# Patient Record
Sex: MALE | Race: WHITE | NOT HISPANIC OR LATINO | Employment: FULL TIME | URBAN - METROPOLITAN AREA
[De-identification: names, ages, dates, MRNs, and addresses within clinical notes are randomized per-mention and may not be internally consistent; named-entity substitution may affect disease eponyms.]

---

## 2017-02-16 ENCOUNTER — ALLSCRIPTS OFFICE VISIT (OUTPATIENT)
Dept: OTHER | Facility: OTHER | Age: 32
End: 2017-02-16

## 2017-02-16 DIAGNOSIS — R55 SYNCOPE AND COLLAPSE: ICD-10-CM

## 2017-02-16 DIAGNOSIS — R42 DIZZINESS AND GIDDINESS: ICD-10-CM

## 2017-02-24 ENCOUNTER — GENERIC CONVERSION - ENCOUNTER (OUTPATIENT)
Dept: OTHER | Facility: OTHER | Age: 32
End: 2017-02-24

## 2017-02-24 LAB
A/G RATIO (HISTORICAL): 2 (ref 1.1–2.5)
ALBUMIN SERPL BCP-MCNC: 4.7 G/DL (ref 3.5–5.5)
ALP SERPL-CCNC: 61 IU/L (ref 39–117)
ALT SERPL W P-5'-P-CCNC: 41 IU/L (ref 0–44)
AST SERPL W P-5'-P-CCNC: 21 IU/L (ref 0–40)
BASOPHILS # BLD AUTO: 0 %
BASOPHILS # BLD AUTO: 0 X10E3/UL (ref 0–0.2)
BILIRUB SERPL-MCNC: 0.3 MG/DL (ref 0–1.2)
BUN SERPL-MCNC: 16 MG/DL (ref 6–20)
BUN/CREA RATIO (HISTORICAL): 18 (ref 8–19)
CALCIUM SERPL-MCNC: 9.9 MG/DL (ref 8.7–10.2)
CHLORIDE SERPL-SCNC: 102 MMOL/L (ref 96–106)
CHOLEST SERPL-MCNC: 141 MG/DL (ref 100–199)
CHOLEST/HDLC SERPL: 3.5 RATIO UNITS (ref 0–5)
CO2 SERPL-SCNC: 23 MMOL/L (ref 18–29)
CREAT SERPL-MCNC: 0.87 MG/DL (ref 0.76–1.27)
DEPRECATED RDW RBC AUTO: 13.5 % (ref 12.3–15.4)
EGFR AFRICAN AMERICAN (HISTORICAL): 133 ML/MIN/1.73
EGFR-AMERICAN CALC (HISTORICAL): 115 ML/MIN/1.73
EOSINOPHIL # BLD AUTO: 0.2 X10E3/UL (ref 0–0.4)
EOSINOPHIL # BLD AUTO: 2 %
GLUCOSE SERPL-MCNC: 102 MG/DL (ref 65–99)
HCT VFR BLD AUTO: 43.3 % (ref 37.5–51)
HDLC SERPL-MCNC: 40 MG/DL
HGB BLD-MCNC: 15 G/DL (ref 12.6–17.7)
IMM.GRANULOCYTES (CD4/8) (HISTORICAL): 0 %
IMM.GRANULOCYTES (CD4/8) (HISTORICAL): 0 X10E3/UL (ref 0–0.1)
LDLC SERPL CALC-MCNC: 80 MG/DL (ref 0–99)
LYMPHOCYTES # BLD AUTO: 2.8 X10E3/UL (ref 0.7–3.1)
LYMPHOCYTES # BLD AUTO: 42 %
MCH RBC QN AUTO: 28.4 PG (ref 26.6–33)
MCHC RBC AUTO-ENTMCNC: 34.6 G/DL (ref 31.5–35.7)
MCV RBC AUTO: 82 FL (ref 79–97)
MONOCYTES # BLD AUTO: 0.6 X10E3/UL (ref 0.1–0.9)
MONOCYTES (HISTORICAL): 8 %
NEUTROPHILS # BLD AUTO: 3.2 X10E3/UL (ref 1.4–7)
NEUTROPHILS # BLD AUTO: 48 %
PLATELET # BLD AUTO: 199 X10E3/UL (ref 150–379)
POTASSIUM SERPL-SCNC: 4.3 MMOL/L (ref 3.5–5.2)
RBC (HISTORICAL): 5.29 X10E6/UL (ref 4.14–5.8)
SODIUM SERPL-SCNC: 144 MMOL/L (ref 134–144)
TOT. GLOBULIN, SERUM (HISTORICAL): 2.4 G/DL (ref 1.5–4.5)
TOTAL PROTEIN (HISTORICAL): 7.1 G/DL (ref 6–8.5)
TRIGL SERPL-MCNC: 106 MG/DL (ref 0–149)
VLDLC SERPL CALC-MCNC: 21 MG/DL (ref 5–40)
WBC # BLD AUTO: 6.7 X10E3/UL (ref 3.4–10.8)

## 2017-02-25 LAB
FERRITIN SERPL-MCNC: 322 NG/ML (ref 30–400)
LDL CHOLESTEROL DIRECT (HISTORICAL): 87 MG/DL (ref 0–99)
LYME 18 KD IGG (HISTORICAL): NORMAL
LYME 23 KD IGG (HISTORICAL): NORMAL
LYME 23 KD IGM (HISTORICAL): NORMAL
LYME 28 KD IGG (HISTORICAL): NORMAL
LYME 30 KD IGG (HISTORICAL): NORMAL
LYME 39 KD IGG (HISTORICAL): NORMAL
LYME 39 KD IGM (HISTORICAL): NORMAL
LYME 41 KD IGG (HISTORICAL): NORMAL
LYME 41 KD IGM (HISTORICAL): NORMAL
LYME 45 KD IGG (HISTORICAL): NORMAL
LYME 58 KD IGG (HISTORICAL): NORMAL
LYME 66 KD IGG (HISTORICAL): NORMAL
LYME 93 KD IGG (HISTORICAL): NORMAL
LYME IGG WB INTERP. (HISTORICAL): NEGATIVE
LYME IGM WB INTERP. (HISTORICAL): NEGATIVE
TSH SERPL DL<=0.05 MIU/L-ACNC: 1.02 UIU/ML (ref 0.45–4.5)

## 2017-03-08 ENCOUNTER — HOSPITAL ENCOUNTER (OUTPATIENT)
Dept: NON INVASIVE DIAGNOSTICS | Facility: CLINIC | Age: 32
Discharge: HOME | End: 2017-03-08
Payer: COMMERCIAL

## 2017-03-08 DIAGNOSIS — R42 DIZZINESS AND GIDDINESS: ICD-10-CM

## 2017-03-08 DIAGNOSIS — R55 SYNCOPE AND COLLAPSE: ICD-10-CM

## 2017-03-08 PROCEDURE — 93226 XTRNL ECG REC<48 HR SCAN A/R: CPT

## 2017-03-08 PROCEDURE — 93306 TTE W/DOPPLER COMPLETE: CPT

## 2017-03-08 PROCEDURE — 93225 XTRNL ECG REC<48 HRS REC: CPT

## 2017-03-30 ENCOUNTER — GENERIC CONVERSION - ENCOUNTER (OUTPATIENT)
Dept: OTHER | Facility: OTHER | Age: 32
End: 2017-03-30

## 2018-01-09 NOTE — MISCELLANEOUS
Message   Recorded as Task   Date: 03/29/2017 02:18 PM, Created By: Randy Mathew   Task Name: Call Back   Assigned To: Edel Mauricio   Regarding Patient: Sophy Loredo, Status: Active   Comment:    Brothers,Amie - 29 Mar 2017 2:18 PM     TASK CREATED  Caller: Self; Care Coordination; [phone] (Home); [phone] (Work)  NICKY IS RETURNING [employer] AND YOU CAN REACH HIM AT    [facility] 25 - 03 Apr 2017 5:58 AM     TASK EDITED  I spoke with the patient on 03/30/2017 and reviewed the results of his testing  Everything was within normal limits  He has had no further symptoms  I believe that his symptoms are most likely related to a viral illness  We will see him back with any recurrent symptoms          Results/Data     (1) COMPREHENSIVE METABOLIC PANEL   Glucose, Serum: 102 mg/dL Abnormal High Reference Range 65-99  BUN: 16 mg/dL Reference Range 6-20  Creatinine, Serum: 0 87 mg/dL Reference Range 0 76-1 27  eGFR If NonAfricn Am: 115 mL/min/1 73 Reference Range >59  eGFR If Africn Am: 133 mL/min/1 73 Reference Range >59  BUN/Creatinine Ratio: 18  Reference Range 8-19  ALT (SGPT): 41 IU/L Reference Range 0-44  Albumin, Serum: 4 7 g/dL Reference Range 3 5-5 5  Globulin, Total: 2 4 g/dL Reference Range 1 5-4 5  A/G Ratio: 2 0  Reference Range 1 1-2 5  Bilirubin, Total: 0 3 mg/dL Reference Range 0 0-1 2  Alkaline Phosphatase, S: 61 IU/L Reference Range   AST (SGOT): 21 IU/L Reference Range 0-40  Sodium, Serum: 144 mmol/L Reference Range 134-144  Potassium, Serum: 4 3 mmol/L Reference Range 3 5-5 2  Chloride, Serum: 102 mmol/L Reference Range   Carbon Dioxide, Total: 23 mmol/L Reference Range 18-29  Calcium, Serum: 9 9 mg/dL Reference Range 8 7-10 2  Protein, Total, Serum: 7 1 g/dL Reference Range 6 0-8 5  (1) CBC/PLT/DIFF   WBC: 6 7 x10E3/uL Reference Range 3 4-10 8  RBC: 5 29 x10E6/uL Reference Range 4 14-5 80  Hemoglobin: 15 0 g/dL Reference Range 12 6-17 7  Hematocrit: 43 3 % Reference Range 37 5-51 0  MCV: 82 fL Reference Range 79-97  MCH: 28 4 pg Reference Range 26 6-33 0  Baso (Absolute): 0 0 x10E3/uL Reference Range 0 0-0 2  Immature Granulocytes: 0 %  Immature Grans (Abs): 0 0 x10E3/uL Reference Range 0 0-0 1  Eos: 2 %  Basos: 0 %  Neutrophils (Absolute): 3 2 x10E3/uL Reference Range 1 4-7 0  Lymphs (Absolute): 2 8 x10E3/uL Reference Range 0 7-3 1  Monocytes(Absolute): 0 6 x10E3/uL Reference Range 0 1-0 9  Eos (Absolute): 0 2 x10E3/uL Reference Range 0 0-0 4  MCHC: 34 6 g/dL Reference Range 31 5-35 7  RDW: 13 5 % Reference Range 12 3-15 4  Platelets: 367 /MP Reference Range 150-379  Neutrophils: 48 %  Lymphs: 42 %  Monocytes: 8 %  (1) FERRITIN   Ferritin, Serum: 322 ng/mL Reference Range   (1) LIPID PANEL, FASTING   Cholesterol, Total: 141 mg/dL Reference Range 100-199  Triglycerides: 106 mg/dL Reference Range 0-149  HDL Cholesterol: 40 mg/dL Reference Range >39  VLDL Cholesterol Jace: 21 mg/dL Reference Range 5-40  LDL Cholesterol Ca mg/dL Reference Range 0-99  T  Chol/HDL Ratio: 3 5 ratio units Reference Range 0 0-5 0  (1) LDL,DIRECT   LDL Chol  (Direct): 87 mg/dL Reference Range 0-99  (1) TSH WITH FT4 REFLEX   TSH: 1 020 uIU/mL Reference Range 0 450-4 500  (LC) Lyme, Western Blot, Serum   IgG P93 Ab  : Absent   IgG P66 Ab : Absent   IgG P58 Ab  : Absent   IgG P45 Ab  : Absent   IgG P41 Ab  : Absent   IgG P39 Ab  : Absent   IgM P39 Ab  : Absent   IgM P23 Ab  : Absent   Lyme IgM WB Interp : Negative   IgG P30 Ab  : Absent   IgG P28 Ab  : Absent   IgG P23 Ab  : Absent   IgG P18 Ab  : Absent   Lyme IgG WB Interp : Negative   IgM P41 Ab  : Absent    ECHO COMPLETE WITH CONTRAST IF INDICATED   ECHO COMPLETE WITH CONTRAST IF INDICATED: [facility], [address] [place] [address]   [phone]     Transthoracic Echocardiogram   2D, M-mode, Doppler, and Color Doppler     Study date:  08-Mar-2017     Patient: Consuelo Garay   MR number: [id number]   Account number: [de-identified]   : 1985   Age: 32 years   Gender: Male   Status: Outpatient   Location: [facility]   Height: 69 in   Weight: 191 6 lb   BP: 124/ 80 mmHg     Indications: C/O lightheadedness and dizziness  Diagnoses: R42  - Dizziness and giddiness     Sonographer:  SELINA Day RDCS RVT   Referring Physician:  Marcial Drake DO   Group:  [id number]   Interpreting Physician:  Anni Khan MD     SUMMARY     LEFT VENTRICLE:   Systolic function was normal  Ejection fraction was estimated to be 60 %  There were no regional wall motion abnormalities  MITRAL VALVE:   There was trace regurgitation  TRICUSPID VALVE:   There was trace regurgitation  HISTORY: PRIOR HISTORY: Patient has no history of cardiovascular disease  PROCEDURE: The study was performed in the [facility]  This was a routine study  The transthoracic approach was used  The study included  complete 2D imaging, M-mode, complete spectral Doppler, and color Doppler  Images were obtained from the parasternal, apical, subcostal, and suprasternal notch  acoustic windows  Image quality was adequate  LEFT VENTRICLE: Size was normal  Systolic function was normal  Ejection fraction was  estimated to be 60 %  There were no regional wall motion abnormalities  Wall thickness  was normal  No evidence of apical thrombus  DOPPLER: Left   ventricular diastolic function parameters were normal      RIGHT VENTRICLE: The size was normal  Systolic function was normal  Wall thickness  was normal      LEFT ATRIUM: Size was normal      RIGHT ATRIUM: Size was normal      MITRAL VALVE: Valve structure was normal  There was normal leaflet separation  DOPPLER: The transmitral velocity was within the normal range  There was no evidence  for stenosis  There was trace regurgitation  AORTIC VALVE: The valve was trileaflet   Leaflets exhibited normal thickness and  normal cuspal separation  DOPPLER: Transaortic velocity was within the normal range  There was no evidence for stenosis  There was no significant   regurgitation  TRICUSPID VALVE: The valve structure was normal  There was normal leaflet  separation  DOPPLER: The transtricuspid velocity was within the normal range  There  was no evidence for stenosis  There was trace regurgitation  PULMONIC VALVE: Leaflets exhibited normal thickness, no calcification, and normal  cuspal separation  DOPPLER: The transpulmonic velocity was within the normal range  There was no significant regurgitation  PERICARDIUM: There was no pericardial effusion  The pericardium was normal in  appearance  AORTA: The root exhibited normal size  SYSTEMIC VEINS: IVC: The inferior vena cava was normal in size  SYSTEM MEASUREMENT TABLES     2D   %FS: 31 46 %   Ao Diam: 3 01 cm   EF(Teich): 59 82 %   IVSd: 0 75 cm   LA Area: 14 59 cm2   LA Diam: 3 61 cm   LVEDV MOD A4C: 107 02 ml   LVEF MOD A4C: 64 07 %   LVESV MOD A4C: 38 45 ml   LVIDd: 4 11 cm   LVIDs: 2 82 cm   LVLd A4C: 8 82 cm   LVLs A4C: 6 81 cm   LVPWd: 0 77 cm   RA Area: 17 13 cm2   RVIDd: 3 61 cm   SV MOD A4C: 68 57 ml   SV(Teich): 44 71 ml     MM   TAPSE: 2 3 cm     PW   MV A Zac: 0 44 m/s   MV Dec [place]: 298 06 cm/s2   MV DecT: 0 24 s   MV E Zac: 0 72 m/s   MV E/A Ratio: 1 64   MV PHT: 70 3 ms   MVA By PHT: 3 13 cm2   SEPTAL E': 0 1 m/s   SEPTAL E/E': 7 42     [employer] Accredited Echocardiography Laboratory     Prepared and electronically signed by     Ashley Ahumada MD   Signed 08-Mar-2017 10:57:43   HOLTER MONITOR - 24 HOUR   HOLTER MONITOR - 24 HOUR: PATIENT NAME:  Verena Khan      AGE:  32 y o  Sex:  male   MRN:  [id number]       PROCEDURE: Holter monitor - 24 hour        INDICATIONS: syncope     HOLTER FINDINGS:     The patient was monitored for 24 continuous hours    This revealed the patient to be in  normal sinus rhythm with an average rate of 68 BPM; a minimum rate of 42 BPM; and a  maximum rate of 121 BPM       There were a total of 0 premature ventricular contractions  There were a total of 48  premature atrial contractions     There was no evidence of atrial fibrillation or atrial flutter  There was no evidence of heart block, and no significant pauses were    seen  No symptoms reported  1  The patient was in Sinus rhythm throughout the duration of the study  2  The average heart rate was 68 bpm      3  Rare ectopy without any arrhythmias  4  No arrhythmias were noted  5  No symptoms reported  Signatures   Electronically signed by : Sampson Medic, DO;  Apr  3 2017  5:59AM EST                       (Author)

## 2018-01-14 VITALS
HEART RATE: 64 BPM | TEMPERATURE: 97.4 F | DIASTOLIC BLOOD PRESSURE: 80 MMHG | WEIGHT: 192 LBS | RESPIRATION RATE: 16 BRPM | SYSTOLIC BLOOD PRESSURE: 124 MMHG | HEIGHT: 69 IN | BODY MASS INDEX: 28.44 KG/M2

## 2018-02-23 ENCOUNTER — HOSPITAL ENCOUNTER (EMERGENCY)
Facility: HOSPITAL | Age: 33
Discharge: HOME | End: 2018-02-23
Attending: EMERGENCY MEDICINE | Admitting: EMERGENCY MEDICINE
Payer: COMMERCIAL

## 2018-02-23 ENCOUNTER — APPOINTMENT (EMERGENCY)
Dept: RADIOLOGY | Facility: HOSPITAL | Age: 33
End: 2018-02-23
Payer: COMMERCIAL

## 2018-02-23 VITALS
OXYGEN SATURATION: 98 % | RESPIRATION RATE: 20 BRPM | HEART RATE: 58 BPM | SYSTOLIC BLOOD PRESSURE: 127 MMHG | TEMPERATURE: 97.6 F | BODY MASS INDEX: 27.49 KG/M2 | HEIGHT: 70 IN | WEIGHT: 192 LBS | DIASTOLIC BLOOD PRESSURE: 80 MMHG

## 2018-02-23 DIAGNOSIS — R07.89 ACUTE CHEST WALL PAIN: Primary | ICD-10-CM

## 2018-02-23 LAB
ATRIAL RATE: 73 BPM
P AXIS: 69 DEGREES
PR INTERVAL: 156 MS
QRS AXIS: 72 DEGREES
QRSD INTERVAL: 102 MS
QT INTERVAL: 384 MS
QTC INTERVAL: 423 MS
T WAVE AXIS: 72 DEGREES
VENTRICULAR RATE: 73 BPM

## 2018-02-23 PROCEDURE — 71046 X-RAY EXAM CHEST 2 VIEWS: CPT

## 2018-02-23 PROCEDURE — 99283 EMERGENCY DEPT VISIT LOW MDM: CPT

## 2018-02-23 PROCEDURE — 93005 ELECTROCARDIOGRAM TRACING: CPT | Performed by: EMERGENCY MEDICINE

## 2018-02-23 PROCEDURE — 93010 ELECTROCARDIOGRAM REPORT: CPT | Performed by: INTERNAL MEDICINE

## 2018-02-23 NOTE — ED NOTES
Pt states pain has improved since taking Ibuprofen 600 mg total at 0310, pain was 7/10 before Ibuprofen       Annita Puente, RN  02/23/18 8288

## 2018-02-23 NOTE — ED PROVIDER NOTES
History  Chief Complaint   Patient presents with    Rib Pain     Presents with C/O intermittent left rib pain since Wednesday, worse with deep breath or movement  Denies any trauma or injury  This 28-year-old man with no past medical history states that he has sharp left anterolateral chest wall pain for the past 3 days  It is worse tonight  He does not recall any injury although he was coughing due to a chest cold last week  Pain is sharp and nonradiating  Is worse with deep breaths, certain movements and palpation  Patient denies fever, hemoptysis, dyspnea, palpitations, extremity pain and swelling  Patient has had no recent surgery, fracture, cancer or long distance airplane flights  There is no family history of thrombophilia or early cardiac disease  None       History reviewed  No pertinent past medical history  Past Surgical History:   Procedure Laterality Date    WISDOM TOOTH EXTRACTION         History reviewed  No pertinent family history  I have reviewed and agree with the history as documented  Social History   Substance Use Topics    Smoking status: Never Smoker    Smokeless tobacco: Never Used    Alcohol use Yes      Comment: rarely        Review of Systems   Constitutional: Negative  HENT: Negative  Eyes: Negative  Respiratory: Negative  Cardiovascular: Negative  Chest pain:  see HPI  Gastrointestinal: Negative  Endocrine: Negative  Genitourinary: Negative  Musculoskeletal: Negative  Skin: Negative  Allergic/Immunologic: Negative  Neurological: Negative  Hematological: Negative  Psychiatric/Behavioral: Negative  All other systems reviewed and are negative        Physical Exam  ED Triage Vitals [02/23/18 0430]   Temperature Pulse Respirations Blood Pressure SpO2   (!) 96 °F (35 6 °C) 70 20 143/93 98 %      Temp Source Heart Rate Source Patient Position - Orthostatic VS BP Location FiO2 (%)   Temporal Monitor Sitting Right arm --      Pain Score       2           Orthostatic Vital Signs  Vitals:    02/23/18 0430 02/23/18 0445   BP: 143/93 122/76   Pulse: 70 63   Patient Position - Orthostatic VS: Sitting Lying       Physical Exam   Constitutional: He is oriented to person, place, and time  He appears well-developed and well-nourished  HENT:   Head: Normocephalic and atraumatic  Right Ear: External ear normal    Left Ear: External ear normal    Mouth/Throat: Oropharynx is clear and moist    Eyes: Conjunctivae and EOM are normal  Pupils are equal, round, and reactive to light  Neck: Normal range of motion  Neck supple  No JVD present  Cardiovascular: Normal rate, regular rhythm, normal heart sounds and intact distal pulses  No murmur heard  Pulmonary/Chest: Effort normal and breath sounds normal  Tenderness:   ReproducibleTenderness of the left lower anterolateral chest wall  There is no crepitus this erythema induration warmth or signs of infection  Abdominal: Soft  Bowel sounds are normal  He exhibits no mass  There is no tenderness  There is no rebound and no guarding  Musculoskeletal: Normal range of motion  He exhibits no edema or tenderness  Lymphadenopathy:     He has no cervical adenopathy  Neurological: He is alert and oriented to person, place, and time  He has normal reflexes  No cranial nerve deficit  Coordination normal    Skin: Skin is warm and dry  Capillary refill takes less than 2 seconds  No rash noted  Psychiatric: He has a normal mood and affect  His behavior is normal    Nursing note and vitals reviewed        ED Medications  Medications - No data to display    Diagnostic Studies  Results Reviewed     None                 No orders to display              Procedures  ECG 12 Lead Documentation  Date/Time: 2/23/2018 4:50 AM  Performed by: Nela Duncan  Authorized by: Nela Duncan     ECG reviewed by me, the ED Provider: yes    Patient location:  ED  Previous ECG:     Previous ECG: Unavailable  Interpretation:     Interpretation: normal             Phone Contacts  ED Phone Contact    ED Course  ED Course                                MDM  Number of Diagnoses or Management Options  Acute chest wall pain: new and requires workup     Amount and/or Complexity of Data Reviewed  Tests in the radiology section of CPT®: ordered and reviewed      CritCare Time    Disposition  Final diagnoses:   None     ED Disposition     None      Follow-up Information    None       Patient's Medications    No medications on file     No discharge procedures on file      ED Provider  Electronically Signed by           Vanessa Avalos,   02/23/18 [facility]   02/23/18 0518

## 2018-07-10 ENCOUNTER — LAB REQUISITION (OUTPATIENT)
Dept: LAB | Facility: HOSPITAL | Age: 33
End: 2018-07-10
Payer: COMMERCIAL

## 2018-07-10 DIAGNOSIS — J34.2 DEVIATED NASAL SEPTUM: ICD-10-CM

## 2018-07-10 DIAGNOSIS — J34.3 HYPERTROPHY OF NASAL TURBINATES: ICD-10-CM

## 2018-07-10 PROCEDURE — 88304 TISSUE EXAM BY PATHOLOGIST: CPT | Performed by: PATHOLOGY

## 2019-02-13 ENCOUNTER — OFFICE VISIT (OUTPATIENT)
Dept: FAMILY MEDICINE CLINIC | Facility: CLINIC | Age: 34
End: 2019-02-13
Payer: COMMERCIAL

## 2019-02-13 VITALS
OXYGEN SATURATION: 98 % | SYSTOLIC BLOOD PRESSURE: 138 MMHG | DIASTOLIC BLOOD PRESSURE: 88 MMHG | TEMPERATURE: 97 F | HEART RATE: 75 BPM | BODY MASS INDEX: 27.35 KG/M2 | WEIGHT: 191 LBS | RESPIRATION RATE: 15 BRPM | HEIGHT: 70 IN

## 2019-02-13 DIAGNOSIS — R35.0 FREQUENT URINATION: ICD-10-CM

## 2019-02-13 DIAGNOSIS — Z13.6 SCREENING FOR CARDIOVASCULAR CONDITION: ICD-10-CM

## 2019-02-13 DIAGNOSIS — N41.0 ACUTE PROSTATITIS: Primary | ICD-10-CM

## 2019-02-13 LAB
SL AMB  POCT GLUCOSE, UA: NORMAL
SL AMB LEUKOCYTE ESTERASE,UA: NORMAL
SL AMB POCT BILIRUBIN,UA: NORMAL
SL AMB POCT BLOOD,UA: NORMAL
SL AMB POCT CLARITY,UA: CLEAR
SL AMB POCT COLOR,UA: YELLOW
SL AMB POCT KETONES,UA: NORMAL
SL AMB POCT NITRITE,UA: NORMAL
SL AMB POCT PH,UA: 6.5
SL AMB POCT SPECIFIC GRAVITY,UA: 1.02
SL AMB POCT URINE PROTEIN: NORMAL
SL AMB POCT UROBILINOGEN: NORMAL

## 2019-02-13 PROCEDURE — 99213 OFFICE O/P EST LOW 20 MIN: CPT | Performed by: FAMILY MEDICINE

## 2019-02-13 PROCEDURE — 81003 URINALYSIS AUTO W/O SCOPE: CPT | Performed by: FAMILY MEDICINE

## 2019-02-13 RX ORDER — CIPROFLOXACIN 500 MG/1
500 TABLET, FILM COATED ORAL EVERY 12 HOURS SCHEDULED
Qty: 20 TABLET | Refills: 0 | Status: SHIPPED | OUTPATIENT
Start: 2019-02-13 | End: 2019-02-23

## 2019-02-13 NOTE — PROGRESS NOTES
Assessment/Plan:  1  Acute prostatitis-the patient had a negative urinalysis in the office  His symptoms are suggestive acute prostatitis  We will treat him with the Cipro as ordered and monitor him closely  We will send the urine for culture as a precaution  We will follow up with results when available  2  Frequent urination-the patient will go for the lab work as ordered to look for underlying causes polyuria  We will follow up with the results when available  He will call with any worsening symptoms such as fever, chills, abdominal pain, nausea, vomiting, or hematuria  Diagnoses and all orders for this visit:    Acute prostatitis  -     Urine culture  -     ciprofloxacin (CIPRO) 500 mg tablet; Take 1 tablet (500 mg total) by mouth every 12 (twelve) hours for 10 days    Frequent urination  -     POCT urine dip auto non-scope  -     Urine culture  -     Hemoglobin A1C W/EAG With Reflex To Glycomark(R); Future  -     Comprehensive metabolic panel; Future  -     CBC and differential; Future  -     LDL cholesterol, direct; Future  -     Lipid panel; Future  -     TSH, 3rd generation with Free T4 reflex; Future  -     ciprofloxacin (CIPRO) 500 mg tablet; Take 1 tablet (500 mg total) by mouth every 12 (twelve) hours for 10 days  -     Hemoglobin A1C W/EAG With Reflex To Glycomark(R)  -     Comprehensive metabolic panel  -     CBC and differential  -     LDL cholesterol, direct  -     Lipid panel  -     TSH, 3rd generation with Free T4 reflex    Screening for cardiovascular condition  -     Hemoglobin A1C W/EAG With Reflex To Glycomark(R); Future  -     Comprehensive metabolic panel; Future  -     CBC and differential; Future  -     LDL cholesterol, direct; Future  -     Lipid panel; Future  -     TSH, 3rd generation with Free T4 reflex;  Future  -     Hemoglobin A1C W/EAG With Reflex To Glycomark(R)  -     Comprehensive metabolic panel  -     CBC and differential  -     LDL cholesterol, direct  -     Lipid panel  -     TSH, 3rd generation with Free T4 reflex       No follow-ups on file  Subjective:   Chief Complaint   Patient presents with    Urinary Frequency     begining 3 days ago        Patient ID: Juan Carlos Rangel is a 35 y o  male presents today for evaluation of urinary symptoms  Juan Carlos Rangel is a 35 y o  Male who presents for evaluation of urinary frequency and discomfort since 2/10/2019  He started with increased frequency initially  He has been feeling lower abdominal pressure and discomfort and he feels uncomfortable sitting  There is lower pressure when standing  There is no problems with urinary symptoms in the past     There is no low back pain and their  He drinks coffee daily and he has not changed his diet  There is no difficulty passing his urine  There is a pinching sensation and slight burning with urination  There is no discharge  The urine is not foul smelling and there is no discharge  There is some increased thirst at times  There are no visual changes  The patient is monogamous- he is  and denies any STD's or any other sexual partners  There are no GI problems  There are no fevers or chills  He was taking Advil as needed  There is tightness in the lower abdomen  Urinary Frequency    This is a new problem  The current episode started in the past 7 days  The problem occurs every urination  The problem has been unchanged  The quality of the pain is described as burning  There has been no fever  He is sexually active  There is no history of pyelonephritis  Associated symptoms include frequency  Pertinent negatives include no chills, discharge, flank pain, hematuria, hesitancy, nausea, possible pregnancy, sweats, urgency or vomiting  He has tried nothing for the symptoms       The following portions of the patient's history were reviewed and updated as appropriate: allergies, current medications, past family history, past medical history, past social history, past surgical history and problem list   Patient Active Problem List   Diagnosis    Seasonal allergic rhinitis     Past Medical History:   Diagnosis Date    Varicella 1987     Past Surgical History:   Procedure Laterality Date    DENTAL SURGERY      Dental implant    WISDOM TOOTH EXTRACTION       No Known Allergies  Family History   Problem Relation Age of Onset    Hypertension Mother     Hyperlipidemia Father     Hypertension Father     Skin cancer Father     Other Father     Alzheimer's disease Maternal Grandmother     Diabetes Paternal Grandmother         Diabetes mellitus with other type with complication    Stroke Paternal Grandmother     Heart disease Paternal Grandfather     Kidney disease Paternal Grandfather     Lung cancer Paternal Grandfather      Social History     Socioeconomic History    Marital status: /Civil Union     Spouse name: Not on file    Number of children: Not on file    Years of education: Not on file    Highest education level: Not on file   Occupational History    Occupation:    Social Needs    Financial resource strain: Not on file    Food insecurity:     Worry: Not on file     Inability: Not on file   [employer] needs:     Medical: Not on file     Non-medical: Not on file   Tobacco Use    Smoking status: Never Smoker    Smokeless tobacco: Never Used   Substance and Sexual Activity    Alcohol use: Yes     Comment: rarely    Drug use: No    Sexual activity: Not on file   Lifestyle    Physical activity:     Days per week: Not on file     Minutes per session: Not on file    Stress: Not on file   Relationships    Social connections:     Talks on phone: Not on file     Gets together: Not on file     Attends Yarsani service: Not on file     Active member of club or organization: Not on file     Attends meetings of clubs or organizations: Not on file     Relationship status: Not on file    Intimate partner violence:     Fear of current or ex partner: Not on file     Emotionally abused: Not on file     Physically abused: Not on file     Forced sexual activity: Not on file   Other Topics Concern    Not on file   Social History Narrative    Occasional alcohol use - As per [employer]     No current outpatient medications on file prior to visit  No current facility-administered medications on file prior to visit  Review of Systems   Constitutional: Negative  Negative for chills  HENT: Negative  Eyes: Negative  Respiratory: Negative  Cardiovascular: Negative  Gastrointestinal: Negative  Negative for nausea and vomiting  Endocrine: Negative  Genitourinary: Positive for frequency  Negative for flank pain, hematuria, hesitancy and urgency  Musculoskeletal: Negative  Skin: Negative  Allergic/Immunologic: Negative  Neurological: Negative  Hematological: Negative  Psychiatric/Behavioral: Negative  Objective:  Vitals:    02/13/19 0813   BP: 138/88   BP Location: Left arm   Patient Position: Sitting   Cuff Size: Standard   Pulse: 75   Resp: 15   Temp: (!) 97 °F (36 1 °C)   TempSrc: Tympanic   SpO2: 98%   Weight: 86 6 kg (191 lb)   Height: 5' 10" (1 778 m)     Body mass index is 27 41 kg/m²  Wt Readings from Last 3 Encounters:   02/13/19 86 6 kg (191 lb)   02/23/18 87 1 kg (192 lb)   02/16/17 87 1 kg (192 lb)     Temp Readings from Last 3 Encounters:   02/13/19 (!) 97 °F (36 1 °C) (Tympanic)   02/23/18 97 6 °F (36 4 °C) (Temporal)   02/16/17 (!) 97 4 °F (36 3 °C) (Tympanic)     BP Readings from Last 3 Encounters:   02/13/19 138/88   02/23/18 127/80   02/16/17 124/80     Pulse Readings from Last 3 Encounters:   02/13/19 75   02/23/18 58   02/16/17 64        Physical Exam   Constitutional: He is oriented to person, place, and time  He appears well-developed and well-nourished  No distress  Eyes: Pupils are equal, round, and reactive to light  EOM are normal    Neck: Normal range of motion  Neck supple   No JVD present  No tracheal deviation present  No thyromegaly present  Cardiovascular: Normal rate, regular rhythm and normal heart sounds  Exam reveals no gallop and no friction rub  No murmur heard  Pulmonary/Chest: Effort normal and breath sounds normal  No stridor  No respiratory distress  He has no wheezes  He has no rales  He exhibits no tenderness  Abdominal: Soft  Bowel sounds are normal  He exhibits no distension and no mass  There is no tenderness  There is no rebound and no guarding  Musculoskeletal: Normal range of motion  Lymphadenopathy:     He has no cervical adenopathy  Neurological: He is alert and oriented to person, place, and time  He has normal reflexes  He displays normal reflexes  No cranial nerve deficit  He exhibits normal muscle tone  Coordination normal    Skin: Skin is warm and dry  No rash noted  He is not diaphoretic  No erythema  No pallor  Nursing note and vitals reviewed         POCT urine dip auto non-scope   Order: [id number]   Status:  Final result   Visible to patient:  No (Not Released)   Next appt:  None   Dx:  Frequent urination   Component 2/13/19  8:17 AM    COLOR,UA yellow    CLARITY,UA clear    SPECIFIC GRAVITY,UA 1 020     PH,UA 6 5    LEUKOCYTE ESTERASE,UA neg    NITRITE,UA neg    GLUCOSE, UA neg    KETONES,UA neg    BILIRUBIN,UA neg    BLOOD,UA neg    POCT URINE PROTEIN neg    SL AMB POCT UROBILINOGEN normal          Specimen Collected: 02/13/19  8:17 AM   Last Resulted: 02/13/19  8:17 AM        Lab Flowsheet      Order Details      View Encounter      Lab and Collection Details

## 2019-02-14 LAB
DATE RECEIVED: NORMAL
NAME ON REQ/SPECIMEN: NORMAL
NAME ON REQ/SPECIMEN: NORMAL
SERVICE CMNT 03-IMP: NORMAL
SPECIMEN SOURCE: NORMAL
TEST ORDERED ON REQ:: NORMAL

## 2019-02-15 LAB
ALBUMIN SERPL-MCNC: 4.6 G/DL (ref 3.6–5.1)
ALBUMIN/GLOB SERPL: 1.8 (CALC) (ref 1–2.5)
ALP SERPL-CCNC: 63 U/L (ref 40–115)
ALT SERPL-CCNC: 42 U/L (ref 9–46)
AST SERPL-CCNC: 20 U/L (ref 10–40)
BASOPHILS # BLD AUTO: 21 CELLS/UL (ref 0–200)
BASOPHILS NFR BLD AUTO: 0.3 %
BILIRUB SERPL-MCNC: 0.5 MG/DL (ref 0.2–1.2)
BUN SERPL-MCNC: 16 MG/DL (ref 7–25)
BUN/CREAT SERPL: NORMAL (CALC) (ref 6–22)
CALCIUM SERPL-MCNC: 9.8 MG/DL (ref 8.6–10.3)
CHLORIDE SERPL-SCNC: 103 MMOL/L (ref 98–110)
CHOLEST SERPL-MCNC: 150 MG/DL
CHOLEST/HDLC SERPL: 3.8 (CALC)
CK MB CFR SERPL ELPH: NORMAL %
CO2 SERPL-SCNC: 27 MMOL/L (ref 20–32)
CONTACT: NORMAL
CREAT SERPL-MCNC: 0.95 MG/DL (ref 0.6–1.35)
EOSINOPHIL # BLD AUTO: 142 CELLS/UL (ref 15–500)
EOSINOPHIL NFR BLD AUTO: 2 %
ERYTHROCYTE [DISTWIDTH] IN BLOOD BY AUTOMATED COUNT: 12.7 % (ref 11–15)
EST. AVERAGE GLUCOSE BLD GHB EST-MCNC: 103 (CALC)
EST. AVERAGE GLUCOSE BLD GHB EST-SCNC: 5.7 (CALC)
GLOBULIN SER CALC-MCNC: 2.5 G/DL (CALC) (ref 1.9–3.7)
GLUCOSE SERPL-MCNC: 87 MG/DL (ref 65–99)
HBA1C MFR BLD: 5.2 % OF TOTAL HGB
HCT VFR BLD AUTO: 42.7 % (ref 38.5–50)
HDLC SERPL-MCNC: 40 MG/DL
HGB BLD-MCNC: 14.4 G/DL (ref 13.2–17.1)
LDLC SERPL CALC-MCNC: 92 MG/DL (CALC)
LDLC SERPL DIRECT ASSAY-MCNC: 96 MG/DL
LYMPHOCYTES # BLD AUTO: 2996 CELLS/UL (ref 850–3900)
LYMPHOCYTES NFR BLD AUTO: 42.2 %
MCH RBC QN AUTO: 27.9 PG (ref 27–33)
MCHC RBC AUTO-ENTMCNC: 33.7 G/DL (ref 32–36)
MCV RBC AUTO: 82.6 FL (ref 80–100)
MONOCYTES # BLD AUTO: 518 CELLS/UL (ref 200–950)
MONOCYTES NFR BLD AUTO: 7.3 %
NEUTROPHILS # BLD AUTO: 3422 CELLS/UL (ref 1500–7800)
NEUTROPHILS NFR BLD AUTO: 48.2 %
NONHDLC SERPL-MCNC: 110 MG/DL (CALC)
PLATELET # BLD AUTO: 202 THOUSAND/UL (ref 140–400)
PMV BLD REES-ECKER: 11.6 FL (ref 7.5–12.5)
POTASSIUM SERPL-SCNC: 4.1 MMOL/L (ref 3.5–5.3)
PROT SERPL-MCNC: 7.1 G/DL (ref 6.1–8.1)
RBC # BLD AUTO: 5.17 MILLION/UL (ref 4.2–5.8)
SL AMB EGFR AFRICAN AMERICAN: 121 ML/MIN/1.73M2
SL AMB EGFR NON AFRICAN AMERICAN: 105 ML/MIN/1.73M2
SODIUM SERPL-SCNC: 138 MMOL/L (ref 135–146)
TRIGL SERPL-MCNC: 86 MG/DL
TSH SERPL-ACNC: 0.85 MIU/L (ref 0.4–4.5)
WBC # BLD AUTO: 7.1 THOUSAND/UL (ref 3.8–10.8)

## 2019-02-15 PROCEDURE — 3044F HG A1C LEVEL LT 7.0%: CPT | Performed by: FAMILY MEDICINE

## 2019-03-05 ENCOUNTER — OFFICE VISIT (OUTPATIENT)
Dept: FAMILY MEDICINE CLINIC | Facility: CLINIC | Age: 34
End: 2019-03-05
Payer: COMMERCIAL

## 2019-03-05 VITALS
BODY MASS INDEX: 28.37 KG/M2 | HEART RATE: 60 BPM | SYSTOLIC BLOOD PRESSURE: 130 MMHG | DIASTOLIC BLOOD PRESSURE: 84 MMHG | HEIGHT: 70 IN | WEIGHT: 198.2 LBS | TEMPERATURE: 97.3 F

## 2019-03-05 DIAGNOSIS — R10.32 LEFT LOWER QUADRANT ABDOMINAL PAIN OF UNKNOWN ETIOLOGY: Primary | ICD-10-CM

## 2019-03-05 DIAGNOSIS — R19.8 ABDOMINAL FULLNESS IN LEFT LOWER QUADRANT: ICD-10-CM

## 2019-03-05 PROCEDURE — 99213 OFFICE O/P EST LOW 20 MIN: CPT | Performed by: FAMILY MEDICINE

## 2019-03-05 PROCEDURE — 3008F BODY MASS INDEX DOCD: CPT | Performed by: FAMILY MEDICINE

## 2019-03-05 NOTE — PROGRESS NOTES
Assessment/Plan:  Left lower abdominal quadrant pain and left lower quadrant fullness-the patient's symptoms have worsened  We will send him for the CT of the abdomen pelvis as ordered for further evaluation of the area and will follow up with results when available  He was instructed to follow up in the emergency room with any worsening abdominal pain, fever, nausea, or vomiting  Will follow up with him closely with the results of the testing  Diagnoses and all orders for this visit:    Left lower quadrant abdominal pain of unknown etiology  -     CT abdomen pelvis w contrast; Future    Abdominal fullness in left lower quadrant  -     CT abdomen pelvis w contrast; Future       No follow-ups on file  Subjective:   Chief Complaint   Patient presents with    Abdominal Pain     left abdominal pain, left groin discomfort with walking, tingling down left leg  pain 3/10        Patient ID: Thaddeus Daly is a 35 y o  male presents today for a follow-up from his last visit  Thaddeus Daly is a 35 y o  male who presents today from a follow-up from his visit on 2/13/2018 when we treated for possible acute prostatitis and frequent urination  He did go for blood work which was essentially unremarkable  We did treat him with a course of Cipro 500 mg twice a day for 10 days  He is here today with new symptoms  The patient is now complaining of left-sided abdominal pain, left groin discomfort with walking, and tingling down his left leg  He felt better after the Cipro  But then started with left lower abdominal pain 1 week ago  He notices some mild distension as well as left lower quadrant pressure that has progressively worsened  The pressure in the peroneal area has resolved and the dysuria has resolved  There was a sharp pain with shoveling snow and bending over on 3/02/2018 and he thought it was a pulled muscle  It has not improved since then    He was having the left lower quadrant discomfort prior to this however  There is pain in the left groin with walking and there is fullness and pressure in the left lower abdomen  There is no back pain  There is pain in the left groin area  There is no bulging in the area  There is no trouble with BM's or urinating  There are no fevers or chills  He has more pressure in the abdomen after eating  He feels more fullness and pressure after eating  There is no GERD  There is no increased gas  There is a normal appetite  There is occasional tingling in his leg  Abdominal Pain   This is a new problem  The current episode started 1 to 4 weeks ago  The onset quality is gradual  The problem occurs constantly  The problem has been gradually worsening  The pain is located in the LLQ and suprapubic region  The pain is at a severity of 3/10  The pain is moderate  The quality of the pain is aching, sharp and a sensation of fullness  Pertinent negatives include no anorexia, arthralgias, belching, constipation, diarrhea, dysuria, fever, flatus, frequency, headaches, hematochezia, hematuria, melena, myalgias, nausea, vomiting or weight loss  Nothing aggravates the pain  The pain is relieved by nothing       The following portions of the patient's history were reviewed and updated as appropriate: allergies, current medications, past family history, past medical history, past social history, past surgical history and problem list   Patient Active Problem List   Diagnosis    Seasonal allergic rhinitis     Past Medical History:   Diagnosis Date    Varicella 1987     Past Surgical History:   Procedure Laterality Date    DENTAL SURGERY      Dental implant    WISDOM TOOTH EXTRACTION       No Known Allergies  Family History   Problem Relation Age of Onset    Hypertension Mother     Hyperlipidemia Father     Hypertension Father    [facility] Skin cancer Father     Other Father     Alzheimer's disease Maternal Grandmother     Diabetes Paternal Grandmother         Diabetes mellitus with other type with complication    Stroke Paternal Grandmother     Heart disease Paternal Grandfather     Kidney disease Paternal Grandfather     Lung cancer Paternal Grandfather      Social History     Socioeconomic History    Marital status: /Civil Union     Spouse name: Not on file    Number of children: Not on file    Years of education: Not on file    Highest education level: Not on file   Occupational History    Occupation:    Social Needs    Financial resource strain: Not on file    Food insecurity:     Worry: Not on file     Inability: Not on file   [facility] needs:     Medical: Not on file     Non-medical: Not on file   Tobacco Use    Smoking status: Never Smoker    Smokeless tobacco: Never Used   Substance and Sexual Activity    Alcohol use: Yes     Comment: rarely    Drug use: No    Sexual activity: Not on file   Lifestyle    Physical activity:     Days per week: Not on file     Minutes per session: Not on file    Stress: Not on file   Relationships    Social connections:     Talks on phone: Not on file     Gets together: Not on file     Attends Christian service: Not on file     Active member of club or organization: Not on file     Attends meetings of clubs or organizations: Not on file     Relationship status: Not on file    Intimate partner violence:     Fear of current or ex partner: Not on file     Emotionally abused: Not on file     Physically abused: Not on file     Forced sexual activity: Not on file   Other Topics Concern    Not on file   Social History Narrative    Occasional alcohol use - As per [employer]     No current outpatient medications on file prior to visit  No current facility-administered medications on file prior to visit  Review of Systems   Constitutional: Negative  Negative for fever and weight loss  HENT: Negative  Eyes: Negative  Respiratory: Negative  Cardiovascular: Negative      Gastrointestinal: Positive for abdominal distention and abdominal pain  Negative for anal bleeding, anorexia, blood in stool, constipation, diarrhea, flatus, hematochezia, melena, nausea, rectal pain and vomiting  Endocrine: Negative  Genitourinary: Negative  Negative for dysuria, frequency and hematuria  Musculoskeletal: Negative  Negative for arthralgias and myalgias  Skin: Negative  Allergic/Immunologic: Negative  Neurological: Negative  Negative for headaches  Hematological: Negative  Psychiatric/Behavioral: Negative  Objective:  Vitals:    03/05/19 1749   BP: 130/84   BP Location: Left arm   Patient Position: Sitting   Cuff Size: Large   Pulse: 60   Temp: (!) 97 3 °F (36 3 °C)   TempSrc: Tympanic   Weight: 89 9 kg (198 lb 3 2 oz)   Height: 5' 10" (1 778 m)     Body mass index is 28 44 kg/m²  Wt Readings from Last 3 Encounters:   03/05/19 89 9 kg (198 lb 3 2 oz)   02/13/19 86 6 kg (191 lb)   02/23/18 87 1 kg (192 lb)     Temp Readings from Last 3 Encounters:   03/05/19 (!) 97 3 °F (36 3 °C) (Tympanic)   02/13/19 (!) 97 °F (36 1 °C) (Tympanic)   02/23/18 97 6 °F (36 4 °C) (Temporal)     BP Readings from Last 3 Encounters:   03/05/19 130/84   02/13/19 138/88   02/23/18 127/80     Pulse Readings from Last 3 Encounters:   03/05/19 60   02/13/19 75   02/23/18 58        Physical Exam   Constitutional: He is oriented to person, place, and time  He appears well-developed and well-nourished  HENT:   Head: Normocephalic and atraumatic  Mouth/Throat: Oropharynx is clear and moist    Eyes: Pupils are equal, round, and reactive to light  EOM are normal    Cardiovascular: Normal rate, regular rhythm, normal heart sounds and intact distal pulses  Exam reveals no gallop and no friction rub  No murmur heard  Pulmonary/Chest: Effort normal and breath sounds normal  No stridor  No respiratory distress  He has no wheezes  He has no rhonchi  He has no rales  He exhibits no tenderness  Abdominal: Soft   Normal appearance, normal aorta and bowel sounds are normal  He exhibits no shifting dullness, no distension, no pulsatile liver, no fluid wave, no abdominal bruit, no ascites, no pulsatile midline mass and no mass  There is no hepatosplenomegaly, splenomegaly or hepatomegaly  There is tenderness in the suprapubic area and left lower quadrant  There is no rigidity, no rebound, no guarding, no CVA tenderness, no tenderness at McBurney's point and negative Ramirez's sign  No hernia  Hernia confirmed negative in the ventral area, confirmed negative in the right inguinal area and confirmed negative in the left inguinal area  There is mild distension and left lower quadrant fullness on exam    Genitourinary: Testes normal and penis normal  Right testis shows no mass, no swelling and no tenderness  Left testis shows no mass, no swelling and no tenderness  Neurological: He is alert and oriented to person, place, and time  Skin: Skin is warm and dry         Office Visit on 02/13/2019   Component Date Value     COLOR,UA 02/13/2019 yellow     CLARITY,UA 02/13/2019 clear     SPECIFIC GRAVITY,UA 02/13/2019 1 020      PH,UA 02/13/2019 6 5     LEUKOCYTE ESTERASE,UA 02/13/2019 neg     NITRITE,UA 02/13/2019 neg     GLUCOSE, UA 02/13/2019 neg     KETONES,UA 02/13/2019 neg     BILIRUBIN,UA 02/13/2019 neg     BLOOD,UA 02/13/2019 neg     POCT URINE PROTEIN 02/13/2019 neg     SL AMB POCT UROBILINOGEN 02/13/2019 normal     Urine Culture Result 02/13/2019      Hemoglobin A1C 02/14/2019 5 2     Estimated Average Glucose 02/14/2019 103     Estimated Average Glucos* 02/14/2019 5 7     Glucose, Random 02/14/2019 87     BUN 02/14/2019 16     Creatinine 02/14/2019 0 95     eGFR Non  02/14/2019 105     eGFR  02/14/2019 121     SL AMB BUN/CREATININE RA* 04/91/4807 NOT APPLICABLE     Sodium 08/39/9955 138     Potassium 02/14/2019 4 1     Chloride 02/14/2019 103     CO2 02/14/2019 27     SL AMB CALCIUM 02/14/2019 9 8     Protein, Total 02/14/2019 7 1     Albumin 02/14/2019 4 6     Globulin 02/14/2019 2 5     Albumin/Globulin Ratio 02/14/2019 1 8     TOTAL BILIRUBIN 02/14/2019 0 5     Alkaline Phosphatase 02/14/2019 63     AST 02/14/2019 20     ALT 02/14/2019 42     White Blood Cell Count 02/14/2019 7 1     Red Blood Cell Count 02/14/2019 5 17     Hemoglobin 02/14/2019 14 4     HCT 02/14/2019 42 7     MCV 02/14/2019 82 6     MCH 02/14/2019 27 9     MCHC 02/14/2019 33 7     RDW 02/14/2019 12 7     Platelet Count 07/72/1916 202     SL AMB MPV 02/14/2019 11 6     Neutrophils (Absolute) 02/14/2019 3,422     Lymphocytes (Absolute) 02/14/2019 2,996     Monocytes (Absolute) 02/14/2019 518     Eosinophils (Absolute) 02/14/2019 142     Basophils ABS 02/14/2019 21     Neutrophils 02/14/2019 48 2     Lymphocytes 02/14/2019 42 2     Monocytes 02/14/2019 7 3     Eosinophils 02/14/2019 2 0     Basophils PCT 02/14/2019 0 3     LDL Cholesterol 02/14/2019 96     Total Cholesterol 02/14/2019 150     HDL 02/14/2019 40*    Triglycerides 02/14/2019 86     LDL Direct 02/14/2019 92     Chol HDLC Ratio 02/14/2019 3 8     Non-HDL Cholesterol 02/14/2019 110     TSH W/RFX TO FREE T4 02/14/2019 0 85     Message 02/13/2019 The labeling of the requisition and/or specimen(s) is in question       Specimen Type 02/13/2019 URINE LÓPEZ     NAME ON REQUISITION:  02/13/2019 NO IDENTIFICATION     TEST ORDERED ON REQ: 02/13/2019 CULTURE     NAME ON REQUISITION:  02/13/2019 JENNIFER BERRY     DATE RECEIVED 02/13/2019 21,319     Comment 02/13/2019      Comment 02/13/2019      Comment 02/13/2019      CONTACT 02/13/2019 APRIL JANA/MA     TESTS AFFECTED  02/13/2019 [facility]A CULT

## 2019-03-08 ENCOUNTER — HOSPITAL ENCOUNTER (OUTPATIENT)
Dept: CT IMAGING | Facility: HOSPITAL | Age: 34
Discharge: HOME | End: 2019-03-08
Payer: COMMERCIAL

## 2019-03-08 DIAGNOSIS — R10.32 LEFT LOWER QUADRANT ABDOMINAL PAIN OF UNKNOWN ETIOLOGY: ICD-10-CM

## 2019-03-08 DIAGNOSIS — R19.8 ABDOMINAL FULLNESS IN LEFT LOWER QUADRANT: ICD-10-CM

## 2019-03-08 PROCEDURE — 74177 CT ABD & PELVIS W/CONTRAST: CPT

## 2019-03-08 RX ADMIN — IOHEXOL 100 ML: 350 INJECTION, SOLUTION INTRAVENOUS at 15:15

## 2019-03-14 ENCOUNTER — TELEPHONE (OUTPATIENT)
Dept: FAMILY MEDICINE CLINIC | Facility: CLINIC | Age: 34
End: 2019-03-14

## 2019-03-18 NOTE — TELEPHONE ENCOUNTER
I spoke with the patient on 3/14/2019 and reviewed the results of the CT scan of his abdomen pelvis  There were no acute findings and no abdominal masses  He did have evidence of two small fat containing inguinal hernias on the left  This could be the source of his tenderness  The patient states he is not having any symptoms currently  I did offer to refer him to General surgery for further evaluation but he wishes to delay that at this time and will monitor his symptoms  We will see him back as scheduled

## 2020-08-26 ENCOUNTER — OFFICE VISIT (OUTPATIENT)
Dept: FAMILY MEDICINE CLINIC | Facility: CLINIC | Age: 35
End: 2020-08-26
Payer: COMMERCIAL

## 2020-08-26 VITALS
TEMPERATURE: 99.1 F | SYSTOLIC BLOOD PRESSURE: 150 MMHG | BODY MASS INDEX: 27.6 KG/M2 | OXYGEN SATURATION: 97 % | HEART RATE: 81 BPM | DIASTOLIC BLOOD PRESSURE: 86 MMHG | HEIGHT: 70 IN | RESPIRATION RATE: 16 BRPM | WEIGHT: 192.8 LBS

## 2020-08-26 DIAGNOSIS — F41.9 ANXIETY: Primary | ICD-10-CM

## 2020-08-26 PROCEDURE — 99214 OFFICE O/P EST MOD 30 MIN: CPT | Performed by: NURSE PRACTITIONER

## 2020-08-26 PROCEDURE — 1036F TOBACCO NON-USER: CPT | Performed by: NURSE PRACTITIONER

## 2020-08-26 PROCEDURE — 3008F BODY MASS INDEX DOCD: CPT | Performed by: NURSE PRACTITIONER

## 2020-08-26 RX ORDER — ESCITALOPRAM OXALATE 5 MG/1
5 TABLET ORAL DAILY
Qty: 30 TABLET | Refills: 1 | Status: SHIPPED | OUTPATIENT
Start: 2020-08-26 | End: 2020-09-08

## 2020-08-26 RX ORDER — LORAZEPAM 0.5 MG/1
0.5 TABLET ORAL EVERY 8 HOURS PRN
Qty: 30 TABLET | Refills: 0 | Status: SHIPPED | OUTPATIENT
Start: 2020-08-26 | End: 2020-09-28 | Stop reason: SDUPTHER

## 2020-08-26 NOTE — PROGRESS NOTES
Assessment/Plan   Problem List Items Addressed This Visit     None      Visit Diagnoses     Anxiety    -  Primary    Relevant Medications    escitalopram (LEXAPRO) 5 mg tablet    LORazepam (ATIVAN) 0 5 mg tablet    Other Relevant Orders    Ambulatory referral to [facility]                Chief Complaint   Patient presents with    Anxiety     Patient reports anxiety and insomnia for approx  2 weeks  Has a lot of stress between his job and his family  Subjective   Patient ID: Mary Allen is a 29 y o  male  Vitals:    08/26/20 1010   BP: 150/86   Pulse: 81   Resp: 16   Temp: 99 1 °F (37 3 °C)   SpO2: 97%     Anxiety   Presents for initial visit  Onset was 1 to 4 weeks ago  The problem has been gradually worsening  Symptoms include decreased concentration, depressed mood, excessive worry, insomnia, irritability, muscle tension, nervous/anxious behavior, panic and restlessness  Patient reports no chest pain, confusion, dizziness, dry mouth, nausea or shortness of breath  Symptoms occur constantly  The severity of symptoms is causing significant distress  The symptoms are aggravated by family issues (pandemic )  The quality of sleep is fair  Nighttime awakenings: several      Risk factors include a major life event  There is no history of anemia, anxiety/panic attacks, arrhythmia, asthma, bipolar disorder, CAD, CHF, chronic lung disease, depression, fibromyalgia, hyperthyroidism or suicide attempts  Past treatments include nothing  The treatment provided no relief  The following portions of the patient's history were reviewed and updated as appropriate: allergies, current medications, past family history, past medical history, past surgical history and problem list     Review of Systems   Constitutional: Positive for activity change, fatigue and irritability  Negative for fever  HENT: Negative  Eyes: Negative  Respiratory: Negative  Negative for shortness of breath      Cardiovascular: Negative  Negative for chest pain  Gastrointestinal: Negative  Negative for nausea  Endocrine: Negative  Genitourinary: Negative  Musculoskeletal: Negative  Skin: Negative  Allergic/Immunologic: Negative  Neurological: Negative  Negative for dizziness  Hematological: Negative  Psychiatric/Behavioral: Positive for decreased concentration and sleep disturbance  Negative for confusion  The patient is nervous/anxious and has insomnia  Objective     Physical Exam  Constitutional:       General: He is in acute distress  Appearance: He is not ill-appearing or toxic-appearing  Comments: tearful through appointment   HENT:      Head: Normocephalic and atraumatic  Right Ear: Tympanic membrane normal       Left Ear: Tympanic membrane normal       Nose: Nose normal       Mouth/Throat:      Mouth: Mucous membranes are moist    Eyes:      Extraocular Movements: Extraocular movements intact  Conjunctiva/sclera: Conjunctivae normal       Pupils: Pupils are equal, round, and reactive to light  Neck:      Musculoskeletal: Normal range of motion and neck supple  Cardiovascular:      Rate and Rhythm: Normal rate and regular rhythm  Pulses: Normal pulses  Heart sounds: Normal heart sounds  Pulmonary:      Effort: Pulmonary effort is normal       Breath sounds: Normal breath sounds  Abdominal:      General: Abdomen is flat  Bowel sounds are normal       Palpations: Abdomen is soft  Musculoskeletal: Normal range of motion  Skin:     General: Skin is warm and dry  Neurological:      Mental Status: He is alert and oriented to person, place, and time  Psychiatric:         Attention and Perception: Attention normal          Mood and Affect: Mood is anxious and depressed  Affect is blunt  Speech: Speech normal          Behavior: Behavior is cooperative  Thought Content:  Thought content is not paranoid or delusional  Thought content does not include homicidal or suicidal ideation  Thought content does not include homicidal or suicidal plan           Cognition and Memory: Cognition and memory normal          Judgment: Judgment normal

## 2020-08-26 NOTE — PATIENT INSTRUCTIONS
Anxiety   WHAT YOU NEED TO KNOW:   Anxiety is a condition that causes you to feel extremely worried or nervous  The feelings are so strong that they can cause problems with your daily activities or sleep  Anxiety may be triggered by something you fear, or it may happen without a cause  Family or work stress, smoking, caffeine, and alcohol can increase your risk for anxiety  Certain medicines or health conditions can also increase your risk  Anxiety can become a long-term condition if it is not managed or treated  DISCHARGE INSTRUCTIONS:   Call 911 if:   · You have chest pain, tightness, or heaviness that may spread to your shoulders, arms, jaw, neck, or back  · You feel like hurting yourself or someone else  Contact your healthcare provider if:   · Your symptoms get worse or do not get better with treatment  · Your anxiety keeps you from doing your regular daily activities  · You have new symptoms since your last visit  · You have questions or concerns about your condition or care  Medicines:   · Medicines  may be given to help you feel more calm and relaxed, and decrease your symptoms  · Take your medicine as directed  Contact your healthcare provider if you think your medicine is not helping or if you have side effects  Tell him of her if you are allergic to any medicine  Keep a list of the medicines, vitamins, and herbs you take  Include the amounts, and when and why you take them  Bring the list or the pill bottles to follow-up visits  Carry your medicine list with you in case of an emergency  Follow up with your healthcare provider within 2 weeks or as directed:  Write down your questions so you remember to ask them during your visits  Manage anxiety:   · Talk to someone about your anxiety  Your healthcare provider may suggest counseling  Cognitive behavioral therapy can help you understand and change how you react to events that trigger your symptoms   You might feel more comfortable talking with a friend or family member about your anxiety  Choose someone you know will be supportive and encouraging  · Find ways to relax  Activities such as exercise, meditation, or listening to music can help you relax  Spend time with friends, or do things you enjoy  · Practice deep breathing  Deep breathing can help you relax when you feel anxious  Focus on taking slow, deep breaths several times a day, or during an anxiety attack  Breathe in through your nose and out through your mouth  · Create a regular sleep routine  Regular sleep can help you feel calmer during the day  Go to sleep and wake up at the same times every day  Do not watch television or use the computer right before bed  Your room should be comfortable, dark, and quiet  · Eat a variety of healthy foods  Healthy foods include fruits, vegetables, low-fat dairy products, lean meats, fish, whole-grain breads, and cooked beans  Healthy foods can help you feel less anxious and have more energy  · Exercise regularly  Exercise can increase your energy level  Exercise may also lift your mood and help you sleep better  Your healthcare provider can help you create an exercise plan  · Do not smoke  Nicotine and other chemicals in cigarettes and cigars can increase anxiety  Ask your healthcare provider for information if you currently smoke and need help to quit  E-cigarettes or smokeless tobacco still contain nicotine  Talk to your healthcare provider before you use these products  · Do not have caffeine  Caffeine can make your symptoms worse  Do not have foods or drinks that are meant to increase your energy level  · Limit or do not drink alcohol  Ask your healthcare provider if alcohol is safe for you  You may not be able to drink alcohol if you take certain anxiety or depression medicines  Limit alcohol to 1 drink per day if you are a woman  Limit alcohol to 2 drinks per day if you are a man   A drink of alcohol is 12 ounces of beer, 5 ounces of wine, or 1½ ounces of liquor  · Do not use drugs  Drugs can make your anxiety worse  It can also make anxiety hard to manage  Talk to your healthcare provider if you use drugs and want help to quit  © 2017 [address] [employer] Information is for End User's use only and may not be sold, redistributed or otherwise used for commercial purposes  All illustrations and images included in CareNotes® are the copyrighted property of A D A M , [employer]  or Dharmesh Batres  The above information is an  only  It is not intended as medical advice for individual conditions or treatments  Talk to your doctor, nurse or pharmacist before following any medical regimen to see if it is safe and effective for you

## 2020-09-08 ENCOUNTER — OFFICE VISIT (OUTPATIENT)
Dept: FAMILY MEDICINE CLINIC | Facility: CLINIC | Age: 35
End: 2020-09-08
Payer: COMMERCIAL

## 2020-09-08 VITALS
DIASTOLIC BLOOD PRESSURE: 78 MMHG | TEMPERATURE: 97.7 F | HEART RATE: 76 BPM | BODY MASS INDEX: 27.32 KG/M2 | OXYGEN SATURATION: 97 % | RESPIRATION RATE: 16 BRPM | WEIGHT: 190.8 LBS | SYSTOLIC BLOOD PRESSURE: 128 MMHG | HEIGHT: 70 IN

## 2020-09-08 DIAGNOSIS — F41.9 ANXIETY: Primary | ICD-10-CM

## 2020-09-08 PROCEDURE — 99213 OFFICE O/P EST LOW 20 MIN: CPT | Performed by: NURSE PRACTITIONER

## 2020-09-08 RX ORDER — ESCITALOPRAM OXALATE 10 MG/1
15 TABLET ORAL DAILY
Qty: 45 TABLET | Refills: 1 | Status: SHIPPED | OUTPATIENT
Start: 2020-09-08 | End: 2020-10-19 | Stop reason: SDUPTHER

## 2020-09-08 NOTE — PROGRESS NOTES
Assessment/Plan   Problem List Items Addressed This Visit     None      Visit Diagnoses     Anxiety    -  Primary    Relevant Medications    escitalopram (LEXAPRO) 10 mg tablet                Chief Complaint   Patient presents with    Follow-up     2 week anxiety f/u       Subjective   Patient ID: Sandeep Hines is a 29 y o  male  Vitals:    09/08/20 0801   BP: 128/78   Pulse: 76   Resp: 16   Temp: 97 7 °F (36 5 °C)   SpO2: 97%     Anxiety   Presents for follow-up (taking 10 mg Lexpro, feeling "somewaht better", "sleeping better" still continues with palpatations and panic at night, wll increase to 15 mg and re-evaluate in 3 weeks) visit  Symptoms include decreased concentration, excessive worry, nervous/anxious behavior and palpitations  Patient reports no insomnia, irritability, muscle tension, restlessness or suicidal ideas  Symptoms occur most days  The severity of symptoms is moderate  The quality of sleep is fair  Compliance with medications is %  Treatment side effects: none expressed  The following portions of the patient's history were reviewed and updated as appropriate: allergies, current medications, past family history, past medical history, past surgical history and problem list     Review of Systems   Constitutional: Negative  Negative for irritability  HENT: Negative  Eyes: Negative  Respiratory: Negative  Cardiovascular: Positive for palpitations  Gastrointestinal: Negative  Endocrine: Negative  Genitourinary: Negative  Musculoskeletal: Negative  Skin: Negative  Allergic/Immunologic: Negative  Neurological: Negative  Hematological: Negative  Psychiatric/Behavioral: Positive for decreased concentration  Negative for suicidal ideas  The patient is nervous/anxious  The patient does not have insomnia  Objective     Physical Exam  Vitals signs and nursing note reviewed  Constitutional:       General: He is not in acute distress  Appearance: Normal appearance  He is not ill-appearing  HENT:      Head: Normocephalic and atraumatic  Eyes:      General:         Right eye: No discharge  Left eye: No discharge  Extraocular Movements: Extraocular movements intact  Conjunctiva/sclera: Conjunctivae normal    Neck:      Musculoskeletal: Normal range of motion  Cardiovascular:      Rate and Rhythm: Normal rate and regular rhythm  Pulses: Normal pulses  Heart sounds: Normal heart sounds  No murmur  Pulmonary:      Effort: Pulmonary effort is normal  No respiratory distress  Breath sounds: Normal breath sounds  No wheezing  Abdominal:      General: Abdomen is flat  Musculoskeletal: Normal range of motion  Skin:     General: Skin is warm and dry  Capillary Refill: Capillary refill takes less than 2 seconds  Neurological:      Mental Status: He is alert and oriented to person, place, and time  Psychiatric:         Mood and Affect: Mood normal          Behavior: Behavior normal          Thought Content:  Thought content normal          Judgment: Judgment normal      No Known Allergies

## 2020-09-08 NOTE — PATIENT INSTRUCTIONS
1  New dose of Lexapro 10 mg tablets, take 1 and 1/2   2  Call if you run low on ativan  3   Schedule with Alpha [facility]

## 2020-09-28 ENCOUNTER — OFFICE VISIT (OUTPATIENT)
Dept: FAMILY MEDICINE CLINIC | Facility: CLINIC | Age: 35
End: 2020-09-28
Payer: COMMERCIAL

## 2020-09-28 VITALS
HEIGHT: 70 IN | DIASTOLIC BLOOD PRESSURE: 82 MMHG | TEMPERATURE: 97.9 F | SYSTOLIC BLOOD PRESSURE: 116 MMHG | BODY MASS INDEX: 27.2 KG/M2 | WEIGHT: 190 LBS | HEART RATE: 66 BPM

## 2020-09-28 DIAGNOSIS — F41.9 ANXIETY: ICD-10-CM

## 2020-09-28 PROCEDURE — 99213 OFFICE O/P EST LOW 20 MIN: CPT | Performed by: NURSE PRACTITIONER

## 2020-09-28 RX ORDER — LORAZEPAM 0.5 MG/1
0.5 TABLET ORAL EVERY 8 HOURS PRN
Qty: 30 TABLET | Refills: 0 | Status: SHIPPED | OUTPATIENT
Start: 2020-09-28 | End: 2022-01-25

## 2020-09-28 NOTE — PROGRESS NOTES
Assessment/Plan   Problem List Items Addressed This Visit     None      Visit Diagnoses     Anxiety        Relevant Medications    LORazepam (ATIVAN) 0 5 mg tablet                Chief Complaint   Patient presents with    Follow-up     3 week        Subjective   Patient ID: Pati Magaña is a 29 y o  male  Vitals:    09/28/20 0825   BP: 116/82   Pulse: 66   Temp: 97 9 °F (36 6 °C)           Anxiety   Presents for follow-up (Currently using 15 mg Lexapro daily, feeling "some what better" appearance and affect has greatly improved since initial visit) visit  Symptoms include decreased concentration and nervous/anxious behavior  Patient reports no chest pain, compulsions, depressed mood, insomnia, irritability, muscle tension, palpitations, panic or shortness of breath  Symptoms occur occasionally  The severity of symptoms is moderate  Hours of sleep per night: using melatonin  The quality of sleep is fair  Nighttime awakenings: occasional      Compliance with medications is %  Treatment side effects: none noted  The following portions of the patient's history were reviewed and updated as appropriate: allergies, current medications, past family history, past social history, past surgical history and problem list     Review of Systems   Constitutional: Negative  Negative for irritability  HENT: Negative  Eyes: Negative  Respiratory: Negative  Negative for shortness of breath  Cardiovascular: Negative  Negative for chest pain and palpitations  Gastrointestinal: Negative  Endocrine: Negative  Genitourinary: Negative  Musculoskeletal: Negative  Skin: Negative  Allergic/Immunologic: Negative  Neurological: Negative  Hematological: Negative  Psychiatric/Behavioral: Positive for decreased concentration  The patient is nervous/anxious  The patient does not have insomnia  Objective     Physical Exam  Vitals signs and nursing note reviewed     Constitutional: General: He is not in acute distress  Appearance: Normal appearance  He is normal weight  He is not ill-appearing  HENT:      Head: Normocephalic  Eyes:      General:         Right eye: No discharge  Left eye: No discharge  Extraocular Movements: Extraocular movements intact  Conjunctiva/sclera: Conjunctivae normal    Neck:      Musculoskeletal: Normal range of motion and neck supple  Cardiovascular:      Rate and Rhythm: Normal rate and regular rhythm  Pulses: Normal pulses  Heart sounds: Normal heart sounds  No murmur  Pulmonary:      Effort: Pulmonary effort is normal  No respiratory distress  Breath sounds: Normal breath sounds  Abdominal:      General: Abdomen is flat  Musculoskeletal: Normal range of motion  Skin:     General: Skin is warm and dry  Neurological:      Mental Status: He is alert and oriented to person, place, and time  Psychiatric:         Mood and Affect: Mood normal          Behavior: Behavior normal          Thought Content:  Thought content normal          Judgment: Judgment normal      No Known Allergies  Patient Active Problem List   Diagnosis    Seasonal allergic rhinitis       Current Outpatient Medications:     escitalopram (LEXAPRO) 10 mg tablet, Take 1 5 tablets (15 mg total) by mouth daily, Disp: 45 tablet, Rfl: 1    LORazepam (ATIVAN) 0 5 mg tablet, Take 1 tablet (0 5 mg total) by mouth every 8 (eight) hours as needed for anxiety, Disp: 30 tablet, Rfl: 0

## 2020-10-05 PROBLEM — F43.23 ADJUSTMENT DISORDER WITH MIXED ANXIETY AND DEPRESSED MOOD: Status: ACTIVE | Noted: 2020-10-05

## 2020-10-19 ENCOUNTER — OFFICE VISIT (OUTPATIENT)
Dept: FAMILY MEDICINE CLINIC | Facility: CLINIC | Age: 35
End: 2020-10-19
Payer: COMMERCIAL

## 2020-10-19 VITALS
HEIGHT: 70 IN | BODY MASS INDEX: 28.15 KG/M2 | HEART RATE: 67 BPM | RESPIRATION RATE: 16 BRPM | DIASTOLIC BLOOD PRESSURE: 76 MMHG | TEMPERATURE: 97.2 F | SYSTOLIC BLOOD PRESSURE: 132 MMHG | WEIGHT: 196.6 LBS | OXYGEN SATURATION: 96 %

## 2020-10-19 DIAGNOSIS — Z13.6 SCREENING FOR CARDIOVASCULAR CONDITION: ICD-10-CM

## 2020-10-19 DIAGNOSIS — Z11.4 SCREENING FOR HIV (HUMAN IMMUNODEFICIENCY VIRUS): ICD-10-CM

## 2020-10-19 DIAGNOSIS — Z13.1 SCREENING FOR DIABETES MELLITUS: ICD-10-CM

## 2020-10-19 DIAGNOSIS — F41.9 ANXIETY: Primary | ICD-10-CM

## 2020-10-19 DIAGNOSIS — Z13.29 SCREENING FOR THYROID DISORDER: ICD-10-CM

## 2020-10-19 PROCEDURE — 99213 OFFICE O/P EST LOW 20 MIN: CPT | Performed by: NURSE PRACTITIONER

## 2020-10-19 RX ORDER — ESCITALOPRAM OXALATE 10 MG/1
15 TABLET ORAL DAILY
Qty: 135 TABLET | Refills: 1 | Status: SHIPPED | OUTPATIENT
Start: 2020-10-19 | End: 2021-05-17 | Stop reason: SDUPTHER

## 2020-11-02 ENCOUNTER — TELEMEDICINE (OUTPATIENT)
Dept: BEHAVIORAL/MENTAL HEALTH CLINIC | Facility: CLINIC | Age: 35
End: 2020-11-02
Payer: COMMERCIAL

## 2020-11-02 DIAGNOSIS — F43.23 ADJUSTMENT DISORDER WITH MIXED ANXIETY AND DEPRESSED MOOD: Primary | ICD-10-CM

## 2020-11-02 PROCEDURE — 90834 PSYTX W PT 45 MINUTES: CPT | Performed by: SOCIAL WORKER

## 2020-12-23 LAB
ALBUMIN SERPL-MCNC: 4.8 G/DL (ref 4–5)
ALBUMIN/GLOB SERPL: 2.1 {RATIO} (ref 1.2–2.2)
ALP SERPL-CCNC: 71 IU/L (ref 39–117)
ALT SERPL-CCNC: 28 IU/L (ref 0–44)
AST SERPL-CCNC: 24 IU/L (ref 0–40)
BASOPHILS # BLD AUTO: 0 X10E3/UL (ref 0–0.2)
BASOPHILS NFR BLD AUTO: 0 %
BILIRUB SERPL-MCNC: 0.4 MG/DL (ref 0–1.2)
BUN SERPL-MCNC: 16 MG/DL (ref 6–20)
BUN/CREAT SERPL: 17 (ref 9–20)
CALCIUM SERPL-MCNC: 9.9 MG/DL (ref 8.7–10.2)
CHLORIDE SERPL-SCNC: 100 MMOL/L (ref 96–106)
CHOLEST SERPL-MCNC: 144 MG/DL (ref 100–199)
CO2 SERPL-SCNC: 26 MMOL/L (ref 20–29)
CREAT SERPL-MCNC: 0.95 MG/DL (ref 0.76–1.27)
EOSINOPHIL # BLD AUTO: 0.1 X10E3/UL (ref 0–0.4)
EOSINOPHIL NFR BLD AUTO: 2 %
ERYTHROCYTE [DISTWIDTH] IN BLOOD BY AUTOMATED COUNT: 12.2 % (ref 11.6–15.4)
GLOBULIN SER-MCNC: 2.3 G/DL (ref 1.5–4.5)
GLUCOSE SERPL-MCNC: 93 MG/DL (ref 65–99)
HCT VFR BLD AUTO: 42.2 % (ref 37.5–51)
HDLC SERPL-MCNC: 40 MG/DL
HGB BLD-MCNC: 14.3 G/DL (ref 13–17.7)
HIV 1+2 AB+HIV1 P24 AG SERPL QL IA: NON REACTIVE
IMM GRANULOCYTES # BLD: 0 X10E3/UL (ref 0–0.1)
IMM GRANULOCYTES NFR BLD: 0 %
LDLC SERPL CALC-MCNC: 88 MG/DL (ref 0–99)
LDLC/HDLC SERPL: 2.2 RATIO (ref 0–3.6)
LYMPHOCYTES # BLD AUTO: 3.3 X10E3/UL (ref 0.7–3.1)
LYMPHOCYTES NFR BLD AUTO: 50 %
MCH RBC QN AUTO: 27.8 PG (ref 26.6–33)
MCHC RBC AUTO-ENTMCNC: 33.9 G/DL (ref 31.5–35.7)
MCV RBC AUTO: 82 FL (ref 79–97)
MONOCYTES # BLD AUTO: 0.5 X10E3/UL (ref 0.1–0.9)
MONOCYTES NFR BLD AUTO: 7 %
NEUTROPHILS # BLD AUTO: 2.8 X10E3/UL (ref 1.4–7)
NEUTROPHILS NFR BLD AUTO: 41 %
PLATELET # BLD AUTO: 229 X10E3/UL (ref 150–450)
POTASSIUM SERPL-SCNC: 4.3 MMOL/L (ref 3.5–5.2)
PROT SERPL-MCNC: 7.1 G/DL (ref 6–8.5)
RBC # BLD AUTO: 5.14 X10E6/UL (ref 4.14–5.8)
SL AMB EGFR AFRICAN AMERICAN: 119 ML/MIN/1.73
SL AMB EGFR NON AFRICAN AMERICAN: 103 ML/MIN/1.73
SL AMB VLDL CHOLESTEROL CALC: 16 MG/DL (ref 5–40)
SODIUM SERPL-SCNC: 136 MMOL/L (ref 134–144)
TRIGL SERPL-MCNC: 81 MG/DL (ref 0–149)
TSH SERPL DL<=0.005 MIU/L-ACNC: 0.72 UIU/ML (ref 0.45–4.5)
WBC # BLD AUTO: 6.8 X10E3/UL (ref 3.4–10.8)

## 2021-01-04 ENCOUNTER — TELEMEDICINE (OUTPATIENT)
Dept: BEHAVIORAL/MENTAL HEALTH CLINIC | Facility: CLINIC | Age: 36
End: 2021-01-04
Payer: COMMERCIAL

## 2021-01-04 ENCOUNTER — TELEPHONE (OUTPATIENT)
Dept: BEHAVIORAL/MENTAL HEALTH CLINIC | Facility: CLINIC | Age: 36
End: 2021-01-04

## 2021-01-04 DIAGNOSIS — F43.23 ADJUSTMENT DISORDER WITH MIXED ANXIETY AND DEPRESSED MOOD: Primary | ICD-10-CM

## 2021-01-04 PROCEDURE — 90832 PSYTX W PT 30 MINUTES: CPT | Performed by: SOCIAL WORKER

## 2021-01-04 NOTE — PSYCH
Virtual Regular Visit    Assessment/Plan:    Problem List Items Addressed This Visit        Other    Adjustment disorder with mixed anxiety and depressed mood - Primary        Reason for visit is   Chief Complaint   Patient presents with    Virtual Regular Visit      Encounter provider Dana Bender    Provider located at [address] [address] [phone]    Recent Visits  No visits were found meeting these conditions  Showing recent visits within past 7 days and meeting all other requirements     Today's Visits  Date Type Provider Dept   01/04/21 Telephone Dana Bender [facility]   01/04/21 Telemedicine Yasmeen Bright [facility]   Showing today's visits and meeting all other requirements     Future Appointments  No visits were found meeting these conditions  Showing future appointments within next 150 days and meeting all other requirements      The patient was identified by name and date of birth  Katelyn Pacheco was informed that this is a telemedicine visit and that the visit is being conducted through [employer] and patient was informed that this is not a secure, HIPAA-compliant platform  He agrees to proceed  My office door was closed  No one else was in the room  He acknowledged consent and understanding of privacy and security of the video platform  The patient has agreed to participate and understands they can discontinue the visit at any time  *This note was not shared with the patient due to this being a psychotherapy note  *    Patient is aware this is a billable service  Subjective  Katelyn Pacheco is a 28 y o  male        HPI     Past Medical History:   Diagnosis Date    Varicella 1987       Past Surgical History:   Procedure Laterality Date    DENTAL SURGERY      Dental implant    WISDOM TOOTH EXTRACTION Current Outpatient Medications   Medication Sig Dispense Refill    escitalopram (LEXAPRO) 10 mg tablet Take 1 5 tablets (15 mg total) by mouth daily 135 tablet 1    LORazepam (ATIVAN) 0 5 mg tablet Take 1 tablet (0 5 mg total) by mouth every 8 (eight) hours as needed for anxiety 30 tablet 0     No current facility-administered medications for this visit  No Known Allergies    Review of Systems    Video Exam    There were no vitals filed for this visit  Physical Exam     (D) Kolby Arndt attended his follow up psychotherapy session today  Gordon Moyer for canceling his last session in December, reporting that he had to physically go into work  Kolby Arndt reports that he has been going into the office periodically throughout the pandemic, and reports that this has been helpful with increasing his comfort  Kolby Arndt discussed ongoing psychosocial stressors related to the global pandemic, the second wave, the coronavirus, and restrictions  Kolby Arndt reports that he hasn't had to take anymore of his PRN medication, and has been managing his symptoms with his lexapro  Kolby Arndt reports that his son's  graduated from college, resulting in them most likely needing to identify another  for their son, which is somewhat anxiety provoking for him  Kolby Arndt reports some interpersonal relationship stressors between he and his wife, discussing some struggles with communicating with one another effectively  Kolby Arndt and this writer processed this at length  Provided ongoing psychoeducation  Discussed ongoing skills  Reviewed limits and boundaries  Modeled effective forms of communication  (A) Kolby Arndt reports symptoms of difficulty relaxing, reporting some symptoms of poor frustration tolerance, irritability, and becoming easily annoyed at times  Kolby Arndt reports symptoms of anxiety, worrying, and nervousness  Kolby Arndt reports feeling tired and having little energy, and reports some sleep disturbances  Donato's mood presented as anxious and his affect appeared to be congruent  Brijesh Thao presented as alert and oriented x3  Brijesh Osuna presented with good eye contact  Donato's speech presented at a normal rate, volume, and rhythm  Brijesh Osuna denies any evident or immediate risk factors for self-harm, SI, or HI  Brijesh Osuna presented as forward thinking, discussed upcoming plans, and identified reasons to live  Brijesh Osuna does not appear to be endorsing criteria for nicole at this time  Brijesh Braulioifeanyi denies symptoms of grandiose thinking, nicole, impulsivity, or hyperactive/ elevated moods  Wil Willishen plans to work on exploring his feelings and emotions, identifying them, acknowledging them, understanding them, and associating them  Brijesh Osuna plans to explore his unmet needs, ask for what he needs, and utilize effective forms of communication to address interpersonal relationship stressors  Brijesh Osuna plans to utilize the sandwich effect, validate, discuss concerns, and validate  Brijesh Osuna plans to work on actively being present in the moment, utilizing deep breathing techniques, counting to ten, and walking away  Brijesh Osuna plans to continue to target fluctuating symptoms with varying skills  Brijesh Osuna plans to increase self-care, and take time for himself  Brijesh Osuna plans to identify ongoing ways to implement limits and boundaries  Brijesh Osuna plans to focus on what he has control over, verses what he doesn't have control over, while practicing radical acceptance  Brijesh Osuna plans to structure his schedule with balance, consistency, and routine  Brijesh Osuna plans to continue to decrease triggers associated with the global pandemic  Donato plans to lean into natural supports  Brijesh Osuna plans to outreach for additional support as needed  I spent 30 minutes directly with the patient during this visit  VIRTUAL VISIT DISCLAIMER    Baldemaria Sada acknowledges that he has consented to an online visit or consultation   He understands that the online visit is based solely on information provided by him, and that, in the absence of a face-to-face physical evaluation by the physician, the diagnosis he receives is both limited and provisional in terms of accuracy and completeness  This is not intended to replace a full medical face-to-face evaluation by the physician  Bridger Cisse understands and accepts these terms

## 2021-01-18 ENCOUNTER — OFFICE VISIT (OUTPATIENT)
Dept: FAMILY MEDICINE CLINIC | Facility: CLINIC | Age: 36
End: 2021-01-18
Payer: COMMERCIAL

## 2021-01-18 VITALS
TEMPERATURE: 97.4 F | SYSTOLIC BLOOD PRESSURE: 126 MMHG | OXYGEN SATURATION: 97 % | BODY MASS INDEX: 29.23 KG/M2 | HEIGHT: 70 IN | DIASTOLIC BLOOD PRESSURE: 80 MMHG | HEART RATE: 70 BPM | WEIGHT: 204.2 LBS | RESPIRATION RATE: 18 BRPM

## 2021-01-18 DIAGNOSIS — Z00.00 ANNUAL PHYSICAL EXAM: Primary | ICD-10-CM

## 2021-01-18 PROCEDURE — 99395 PREV VISIT EST AGE 18-39: CPT | Performed by: NURSE PRACTITIONER

## 2021-01-18 NOTE — PATIENT INSTRUCTIONS

## 2021-01-18 NOTE — PROGRESS NOTES
[facility]    NAME: Darius Edwards  AGE: 28 y o  SEX: male  : 1985     DATE: 2021     Assessment and Plan:     Problem List Items Addressed This Visit     None      Visit Diagnoses     Annual physical exam    -  Primary          Immunizations and preventive care screenings were discussed with patient today  Appropriate education was printed on patient's after visit summary  Counseling:  Alcohol/drug use: discussed moderation in alcohol intake, the recommendations for healthy alcohol use, and avoidance of illicit drug use  Dental Health: discussed importance of regular tooth brushing, flossing, and dental visits  Injury prevention: discussed safety/seat belts, safety helmets, smoke detectors, carbon dioxide detectors, and smoking near bedding or upholstery  Sexual health: discussed sexually transmitted diseases, partner selection, use of condoms, avoidance of unintended pregnancy, and contraceptive alternatives  · Exercise: the importance of regular exercise/physical activity was discussed  Recommend exercise 3-5 times per week for at least 30 minutes  BMI Counseling: Body mass index is 29 3 kg/m²  The BMI is above normal  Nutrition recommendations include decreasing portion sizes, encouraging healthy choices of fruits and vegetables, decreasing fast food intake, consuming healthier snacks, limiting drinks that contain sugar, moderation in carbohydrate intake, increasing intake of lean protein, reducing intake of saturated and trans fat and reducing intake of cholesterol  Exercise recommendations include moderate physical activity 150 minutes/week and strength training exercises  Depression Screening and Follow-up Plan: Patient's depression screening was positive with a PHQ-2 score of 0  Their PHQ-9 score was 0  Clincally patient does not have depression  No treatment is required           Return in 6 months (on 2021)  Chief Complaint:     Chief Complaint   Patient presents with    Annual Exam      History of Present Illness:     Adult Annual Physical   Patient here for a comprehensive physical exam  The patient reports no problems  Diet and Physical Activity  · Diet/Nutrition: well balanced diet and consuming 3-5 servings of fruits/vegetables daily  · Exercise: walking and 5-7 times a week on average  Depression Screening  PHQ-9 Depression Screening    PHQ-9:   Frequency of the following problems over the past two weeks:      Little interest or pleasure in doing things: 0 - not at all  Feeling down, depressed, or hopeless: 0 - not at all  Trouble falling or staying asleep, or sleeping too much: 0 - not at all  Feeling tired or having little energy: 0 - not at all  Poor appetite or overeatin - not at all  Feeling bad about yourself - or that you are a failure or have let yourself or your family down: 0 - not at all  Trouble concentrating on things, such as reading the newspaper or watching television: 0 - not at all  Moving or speaking so slowly that other people could have noticed  Or the opposite - being so fidgety or restless that you have been moving around a lot more than usual: 0 - not at all  Thoughts that you would be better off dead, or of hurting yourself in some way: 0 - not at all  PHQ-2 Score: 0  PHQ-9 Score: 0       General Health  · Sleep: sleeps well and gets 7-8 hours of sleep on average  · Hearing: normal - bilateral   · Vision: no vision problems, goes for regular eye exams, most recent eye exam <1 year ago and wears glasses  · Dental: regular dental visits and brushes teeth twice daily   Health  · History of STDs?: no      Review of Systems:     Review of Systems   Constitutional: Negative  Negative for activity change and fatigue  HENT: Negative  Negative for congestion  Eyes: Negative  Negative for visual disturbance  Respiratory: Negative  Negative for cough and shortness of breath  Cardiovascular: Negative  Negative for chest pain and palpitations  Gastrointestinal: Negative  Negative for abdominal distention, constipation, diarrhea and nausea  Endocrine: Negative  Genitourinary: Negative  Negative for difficulty urinating  Musculoskeletal: Negative  Negative for arthralgias and myalgias  Skin: Negative  Negative for rash and wound  Allergic/Immunologic: Positive for environmental allergies  Neurological: Negative  Hematological: Negative  Psychiatric/Behavioral: Negative         Past Medical History:     Past Medical History:   Diagnosis Date    Varicella 1987      Past Surgical History:     Past Surgical History:   Procedure Laterality Date    DENTAL SURGERY      Dental implant    WISDOM TOOTH EXTRACTION        Social History:     E-Cigarette/Vaping    E-Cigarette Use Never User      E-Cigarette/Vaping Substances    Nicotine No     THC No     CBD No     Flavoring No     Other No     Unknown No      Social History     Socioeconomic History    Marital status: /Civil Union     Spouse name: None    Number of children: 1    Years of education: None    Highest education level: None   Occupational History    Occupation:    Social Needs    Financial resource strain: Not hard at all   [id number] insecurity     Worry: Never true     Inability: Never true    Transportation needs     Medical: No     Non-medical: No   Tobacco Use    Smoking status: Never Smoker    Smokeless tobacco: Never Used   Substance and Sexual Activity    Alcohol use: Yes     Frequency: 2-4 times a month     Drinks per session: 1 or 2     Binge frequency: Never     Comment: rarely    Drug use: No    Sexual activity: Yes     Partners: Female     Birth control/protection: None   Lifestyle    Physical activity     Days per week: 7 days     Minutes per session: 90 min    Stress: Very much   Relationships    Social connections Talks on phone: Twice a week     Gets together: Once a week     Attends Hindu service: More than 4 times per year     Active member of club or organization: No     Attends meetings of clubs or organizations: Never     Relationship status:     Intimate partner violence     Fear of current or ex partner: No     Emotionally abused: No     Physically abused: No     Forced sexual activity: No   Other Topics Concern    None   Social History Narrative    Occasional alcohol use - As per [employer]      Family History:     Family History   Problem Relation Age of Onset    Hypertension Mother     Hyperlipidemia Father     Hypertension Father     Skin cancer Father     Other Father     Alzheimer's disease Maternal Grandmother     Diabetes Paternal Grandmother         Diabetes mellitus with other type with complication    Stroke Paternal Grandmother     Heart disease Paternal Grandfather     Kidney disease Paternal Grandfather     Lung cancer Paternal Grandfather       Current Medications:     Current Outpatient Medications   Medication Sig Dispense Refill    escitalopram (LEXAPRO) 10 mg tablet Take 1 5 tablets (15 mg total) by mouth daily 135 tablet 1    LORazepam (ATIVAN) 0 5 mg tablet Take 1 tablet (0 5 mg total) by mouth every 8 (eight) hours as needed for anxiety 30 tablet 0     No current facility-administered medications for this visit  Allergies:     No Known Allergies   Physical Exam:     /80 (BP Location: Left arm, Patient Position: Sitting, Cuff Size: Large)   Pulse 70   Temp (!) 97 4 °F (36 3 °C) (Tympanic)   Resp 18   Ht 5' 10" (1 778 m)   Wt 92 6 kg (204 lb 3 2 oz)   SpO2 97%   BMI 29 30 kg/m²     Physical Exam  Vitals signs and nursing note reviewed  Constitutional:       General: He is not in acute distress  Appearance: Normal appearance  He is not ill-appearing, toxic-appearing or diaphoretic  HENT:      Head: Normocephalic and atraumatic        Right Ear: Tympanic membrane, ear canal and external ear normal  There is no impacted cerumen  Left Ear: Tympanic membrane, ear canal and external ear normal  There is no impacted cerumen  Nose: Nose normal  No congestion or rhinorrhea  Mouth/Throat:      Mouth: Mucous membranes are moist       Pharynx: Oropharynx is clear  No oropharyngeal exudate or posterior oropharyngeal erythema  Eyes:      General:         Right eye: No discharge  Left eye: No discharge  Extraocular Movements: Extraocular movements intact  Conjunctiva/sclera: Conjunctivae normal       Pupils: Pupils are equal, round, and reactive to light  Neck:      Musculoskeletal: Normal range of motion and neck supple  No neck rigidity or muscular tenderness  Vascular: No carotid bruit  Cardiovascular:      Rate and Rhythm: Normal rate and regular rhythm  Pulses: Normal pulses  Heart sounds: Normal heart sounds  No murmur  No friction rub  No gallop  Pulmonary:      Effort: Pulmonary effort is normal  No respiratory distress  Breath sounds: Normal breath sounds  No stridor  No wheezing, rhonchi or rales  Chest:      Chest wall: No tenderness  Abdominal:      General: Abdomen is flat  Bowel sounds are normal  There is no distension  Palpations: Abdomen is soft  Tenderness: There is no abdominal tenderness  There is no right CVA tenderness or left CVA tenderness  Hernia: No hernia is present  Musculoskeletal: Normal range of motion  General: No swelling  Right lower leg: No edema  Left lower leg: No edema  Lymphadenopathy:      Cervical: No cervical adenopathy  Skin:     General: Skin is warm and dry  Capillary Refill: Capillary refill takes less than 2 seconds  Coloration: Skin is not jaundiced or pale  Findings: No lesion or rash  Neurological:      General: No focal deficit present        Mental Status: He is alert and oriented to person, place, and time  Cranial Nerves: No cranial nerve deficit  Sensory: No sensory deficit  Motor: No weakness  Coordination: Coordination normal       Gait: Gait normal       Deep Tendon Reflexes: Reflexes normal    Psychiatric:         Mood and Affect: Mood normal          Behavior: Behavior normal          Thought Content:  Thought content normal          Judgment: Judgment normal           Lexis Bradshaw, [facility]

## 2021-02-15 ENCOUNTER — TELEMEDICINE (OUTPATIENT)
Dept: BEHAVIORAL/MENTAL HEALTH CLINIC | Facility: CLINIC | Age: 36
End: 2021-02-15
Payer: COMMERCIAL

## 2021-02-15 DIAGNOSIS — F43.23 ADJUSTMENT DISORDER WITH MIXED ANXIETY AND DEPRESSED MOOD: Primary | ICD-10-CM

## 2021-02-15 PROCEDURE — 90834 PSYTX W PT 45 MINUTES: CPT | Performed by: SOCIAL WORKER

## 2021-02-15 NOTE — PSYCH
Virtual Regular Visit    Assessment/Plan:    Problem List Items Addressed This Visit        Other    Adjustment disorder with mixed anxiety and depressed mood - Primary        Reason for visit is No chief complaint on file  Encounter provider [place]    Provider located at [address] [address]  [phone]    Recent Visits  No visits were found meeting these conditions  Showing recent visits within past 7 days and meeting all other requirements     Today's Visits  Date Type Provider Dept   02/15/21 Telemedicine [place] [facility]   Showing today's visits and meeting all other requirements     Future Appointments  No visits were found meeting these conditions  Showing future appointments within next 150 days and meeting all other requirements      The patient was identified by name and date of birth  Abdullahi Pardo was informed that this is a telemedicine visit and that the visit is being conducted through [employer] and patient was informed that this is not a secure, HIPAA-compliant platform  He agrees to proceed  My office door was closed  No one else was in the room  He acknowledged consent and understanding of privacy and security of the video platform  The patient has agreed to participate and understands they can discontinue the visit at any time  *This note was not shared with the patient due to it being a psychotherapy session  *     Patient is aware this is a billable service  Subjective  Abdullahi Pardo is a 28 y o  male        HPI     Past Medical History:   Diagnosis Date    Varicella 1987       Past Surgical History:   Procedure Laterality Date    DENTAL SURGERY      Dental implant    WISDOM TOOTH EXTRACTION         Current Outpatient Medications   Medication Sig Dispense Refill    escitalopram (LEXAPRO) 10 mg tablet Take 1 5 tablets (15 mg total) by mouth daily 135 tablet 1    LORazepam (ATIVAN) 0 5 mg tablet Take 1 tablet (0 5 mg total) by mouth every 8 (eight) hours as needed for anxiety 30 tablet 0     No current facility-administered medications for this visit  No Known Allergies    Review of Systems    Video Exam    There were no vitals filed for this visit  Physical Exam     (D) Pamella Goldman attended her follow up psychotherapy session today  Pamella Goldman reports that since her last session, he has noticed a slight decrease in the intensity and frequency of his symptoms  Pamella Goldman reports that his wife is 20 weeks pregnant, and reports that he was initially having anticipatory anxiety and stress surrounding this; however, reports that he has adjusted to this  Pamella Goldman spent time discussing interpersonal relationship stressors between he and his wife, describing different approaches to things around the house, such as the , organization, bills, opening the mail, and various house hold responsibilities  Pamella Goldman spent time discussing specific details surrounding this, such as having a particular way of opening the mail, or fixing things that are out of place as soon as he walks into the house before greeting his wife, getting overly frustrated with others aren't following the rules, such as 's not staying in-between the lines on the road when driving, etc  Pamella Susi and this writer processed this at length  Provided ongoing psychoeducation  Discussed ongoing skills  Reviewed limits and boundaries  Modeled effective forms of communication  (A) Pamella Goldman presented as alert and oriented x3  Donato's speech presented at a normal rate, volume, and rhythm  Presented with good eye contact  Pamella Goldman denies any evident or immediate risk factors for self-harm, SI, or HI  Presented as future oriented during session, discussed upcoming plans, and identified reasons to live   Does not appear to be endorsing criteria for nicole at this time  Denies any symptoms of impulsivity, grandiose thinking, hyperactive/ elevated moods, or nicole  Donato's mood presented as slightly anxious and his affect appeared to be congruent  Cecil Klaus describes some symptoms of anticipatory anxiety, worrying, and nervousness at times  Cecil Klaus reports a reduction in the intensity and frequency of his symptoms since his last session  Cecil Klaus describes some obsessive tendencies, desiring things in a certain order, presenting as rigid, using multiple lists on a daily basis, and overwhelming need for order, extreme attention to detail, feeling discomfort, irritability, poor frustration tolerance, and emotion dysregulation when things are out of place  Denies any ritual behaviors  Caty Sensor) Donato plans to work on researching symptoms from OCPD to GAGAN, to review, discuss, and process for next session  Cecil Klaus plans to work on increasing self-awareness in relation to rigidity, and identify areas in his life where he experiences this  Cecil Klaus plans to work on validating his feelings, having compassion for himself, making space for his feelings and emotions, and increasing positive self-talk  Cecil Klaus plans to work on expanding comfort with emotions, working on expressing his feelings utilizing effective forms of communication  Donato plans to lean into natural supports  Cecil Klaus plans to utilize ongoing skills to target fluctuating symptoms  Cecil Kluas plans to utilize deep breathing techniques, work on being present in the moment, and taking time for himself  Cecil Klaus plans to outreach for additional support as needed  I spent 45 minutes directly with the patient during this visit  VIRTUAL VISIT DISCLAIMER    Willow Payne acknowledges that he has consented to an online visit or consultation   He understands that the online visit is based solely on information provided by him, and that, in the absence of a face-to-face physical evaluation by the physician, the diagnosis he receives is both limited and provisional in terms of accuracy and completeness  This is not intended to replace a full medical face-to-face evaluation by the physician  Asim Mcintyre understands and accepts these terms

## 2021-03-22 ENCOUNTER — TELEMEDICINE (OUTPATIENT)
Dept: BEHAVIORAL/MENTAL HEALTH CLINIC | Facility: CLINIC | Age: 36
End: 2021-03-22
Payer: COMMERCIAL

## 2021-03-22 DIAGNOSIS — F60.5 OBSESSIVE-COMPULSIVE PERSONALITY DISORDER IN ADULT (HCC): ICD-10-CM

## 2021-03-22 DIAGNOSIS — F43.23 ADJUSTMENT DISORDER WITH MIXED ANXIETY AND DEPRESSED MOOD: Primary | ICD-10-CM

## 2021-03-22 PROCEDURE — 90834 PSYTX W PT 45 MINUTES: CPT | Performed by: SOCIAL WORKER

## 2021-03-22 NOTE — PSYCH
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 [facility])        Reason for visit is   Chief Complaint   Patient presents with    Virtual Regular Visit      Encounter provider [place] [place]    Provider located at [address] [address] [phone]    Recent Visits  No visits were found meeting these conditions  Showing recent visits within past 7 days and meeting all other requirements     Today's Visits  Date Type Provider Dept   03/22/21 Telemedicine [facility]   Showing today's visits and meeting all other requirements     Future Appointments  No visits were found meeting these conditions  Showing future appointments within next 150 days and meeting all other requirements      The patient was identified by name and date of birth  Namrata Nur was informed that this is a telemedicine visit and that the visit is being conducted through [employer] and patient was informed that this is not a secure, HIPAA-compliant platform  He agrees to proceed  My office door was closed  No one else was in the room  He acknowledged consent and understanding of privacy and security of the video platform  The patient has agreed to participate and understands they can discontinue the visit at any time  *This note was not shared with the patient as a result of this being a psychotherapy note  *     Patient is aware this is a billable service  Subjective  Namrata Nur is a 28 y o  male      HPI     Past Medical History:   Diagnosis Date    Varicella 1987       Past Surgical History:   Procedure Laterality Date    DENTAL SURGERY      Dental implant    WISDOM TOOTH EXTRACTION Current Outpatient Medications   Medication Sig Dispense Refill    escitalopram (LEXAPRO) 10 mg tablet Take 1 5 tablets (15 mg total) by mouth daily 135 tablet 1    LORazepam (ATIVAN) 0 5 mg tablet Take 1 tablet (0 5 mg total) by mouth every 8 (eight) hours as needed for anxiety 30 tablet 0     No current facility-administered medications for this visit  No Known Allergies    Review of Systems    Video Exam    There were no vitals filed for this visit  Physical Exam     (D) Jose Ramires attended his follow up psychotherapy session today  Jose Ramires reports that since his last session, she reports some stress in relation to buying a new couch  Jose Ramires reports struggling with decision making, feeling overwhelmed, and reporting some irritability in relation to this  Jose Ramires discussed stressors surrounding the global pandemic  Jose Ramires reports that since his last session, he did some research between GAGAN, and OCPD  Jose Ramires discussed and processed this, and provided various examples as to where he sees symptoms of OCPD in his life in general, along with interpersonal relationship stressors with his wife  Jose Ramires reported that his symptoms of OCPD have caused interpersonal relationship stressors between he and his wife over the years, and provided various examples of this  Jose Ramires reported that he lost his notebook that he writes lists in for a week, and reported that he didn't feel any distress from this, yet reported that he didn't feel as efficient, and effective, until he found his notebook, and could resume making lists  Jose Ramires describes struggling with spontaneous things, reporting that he only likes to do things that he has plans for  Jose Ramires describes struggles with a desire for perfectionism, and reports that he struggles with relying on others, and believing that others will not complete a task the way that he would   Jose Ramires reports that he remembers at a young age, his teachers would describe him as struggling with perfectionism  Venenicholea Reyna described his upbringing as orderly and rigid, describing him as being similar to her father  Venetta Lights discussed having distress starting in elementary school with having a desire for grades that were 100%, and getting his homework done, etc  Venelizbeth Lights provided an example of an argument he and his wife got into with hanging curtains, reporting that he was obsessive with having even spaces between the rods, down the the milammeter, and having symmetrical standards, and having the same length of the curtains hanging from the floor  Venetta Lights reports that he connects perfectionism to self-wroth, reporting that he feels like a failure if something doesn't go the way he intended  Venetta Lights and this writer processed this at length  Discussed ongoing skills  Reviewed limits and boundaries  Modeled effective forms of communication  Provided ongoing psychoeducation  (A) Donato's speech presented at a normal rate, volume, and rhythm  Venetta Lights presented as alert and oriented x3  Venetta Lights presented with good eye contact  Venetta Lights does not appear to be endorsing criteria for nicole at this time  Venetta Lights denies any evident or immediate risk factors for self-harm, SI, or HI  Venetta Lights presented as forward thinking during session  Donato's mood presented as anxious, and his affect appeared to be congruent  Venetta Lights describes symptoms of feeling nervous, anxious, and on edge  Venetta Lights reports some irritability, poor frustration tolerance, and becoming easily annoyed  Venetta Lights reports some trouble relaxing  Venetta Lights describes feeling tired and having little energy  Venetta Lights describes symptoms of OCPD, reporting and overwhelming need for order, having structure, and having a rigid adherence to rules and regulations  Venetta Lights reports that when things are not in order, this triggers his anxiety, and sleep disturbances  Venetta Lights describes himself as having a desire to be in control    Venetta Lights reports some intrusive thoughts, desiring order, and process  Spencer Fox reports that he uses lists to complete things, yet reports that he doesn't believe it is excessive  Spencer Fox describes some negative thinking traps at time  Hernan De Luna) Donato plans to continue to further educate himself outside of psychotherapy sessions, on OCPD  Spencer Fox plans to discuss this with his wife, and ask for feedback from his wife in relation to the research that he has done on OCPD verses GAGAN, and how she observes and interacts with him  Spencer Fox plans to work on expanding self-awareness in relation to this, and further observe and track his symptoms, cycles, and stressors  Spencer Fox plans to prioritize his mental health symptoms, and utilize the use of self-care  Jyothi Leos plans to work on being present in the moment  Spencer Ruddy plans to work on identifying ongoing ways to implement limits and boundaries  Spencer Dock plans to work on increasing self-awareness in relation to feelings and emotions and work on identifying these acknowledging these, and associating them  Spencer Ruddy plans to work on express his feelings, and utilizing effective forms of communication to strengthen interpersonal relationship  Spencer Ruddy plans to continue to utilize deep breathing techniques, practice mindfulness and meditation techniques  Spencer Dock plans to utilize ongoing coping skills, distress tolerance skills, distraction techniques, and grounding techniques, to target fluctuating symptoms  Donato plans to lean into natural supports  Spencer Ruddy plans to outreach for additional support as needed  I spent 45 minutes directly with the patient during this visit  VIRTUAL VISIT DISCLAIMER    Bridger Cisse acknowledges that he has consented to an online visit or consultation   He understands that the online visit is based solely on information provided by him, and that, in the absence of a face-to-face physical evaluation by the physician, the diagnosis he receives is both limited and provisional in terms of accuracy and completeness  This is not intended to replace a full medical face-to-face evaluation by the physician  Prakash Marcum understands and accepts these terms

## 2021-04-09 DIAGNOSIS — Z23 ENCOUNTER FOR IMMUNIZATION: ICD-10-CM

## 2021-04-25 DIAGNOSIS — F41.9 ANXIETY: ICD-10-CM

## 2021-04-26 RX ORDER — ESCITALOPRAM OXALATE 10 MG/1
TABLET ORAL
Qty: 135 TABLET | Refills: 1 | OUTPATIENT
Start: 2021-04-26

## 2021-05-04 ENCOUNTER — TELEPHONE (OUTPATIENT)
Dept: BEHAVIORAL/MENTAL HEALTH CLINIC | Facility: CLINIC | Age: 36
End: 2021-05-04

## 2021-05-04 ENCOUNTER — TELEMEDICINE (OUTPATIENT)
Dept: BEHAVIORAL/MENTAL HEALTH CLINIC | Facility: CLINIC | Age: 36
End: 2021-05-04
Payer: COMMERCIAL

## 2021-05-04 DIAGNOSIS — F43.23 ADJUSTMENT DISORDER WITH MIXED ANXIETY AND DEPRESSED MOOD: Primary | ICD-10-CM

## 2021-05-04 DIAGNOSIS — F60.5 OBSESSIVE-COMPULSIVE PERSONALITY DISORDER IN ADULT (HCC): ICD-10-CM

## 2021-05-04 PROCEDURE — 90834 PSYTX W PT 45 MINUTES: CPT | Performed by: SOCIAL WORKER

## 2021-05-04 NOTE — PSYCH
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 [facility])        Goals addressed in session: Follow up psychotherapy session  Reason for visit is   Chief Complaint   Patient presents with    Virtual Regular Visit      Encounter provider [facility]    Provider located at [address] [place]  [address] [id number] [facility]  [phone]    Recent Visits  No visits were found meeting these conditions  Showing recent visits within past 7 days and meeting all other requirements     Today's Visits  Date Type Provider Dept   05/04/21 Telephone [facility]   05/04/21 Telemedicine [facility]   Showing today's visits and meeting all other requirements     Future Appointments  No visits were found meeting these conditions  Showing future appointments within next 150 days and meeting all other requirements      The patient was identified by name and date of birth  Monique Urbina was informed that this is a telemedicine visit and that the visit is being conducted through [facility] [address] and patient was informed that this is not a secure, HIPAA-compliant platform  He agrees to proceed  My office door was closed  No one else was in the room  He acknowledged consent and understanding of privacy and security of the video platform  The patient has agreed to participate and understands they can discontinue the visit at any time  *This note was not shared with the patient as a result of this being a psychotherapy note  *     Patient is aware this is a billable service  Subjective  Monique Urbina is a 28 y o  male      HPI     Past Medical History:   Diagnosis Date    Varicella 1987       Past Surgical History:   Procedure Laterality Date    DENTAL SURGERY      Dental implant    WISDOM TOOTH EXTRACTION         Current Outpatient Medications   Medication Sig Dispense Refill    escitalopram (LEXAPRO) 10 mg tablet Take 1 5 tablets (15 mg total) by mouth daily 135 tablet 1    LORazepam (ATIVAN) 0 5 mg tablet Take 1 tablet (0 5 mg total) by mouth every 8 (eight) hours as needed for anxiety 30 tablet 0     No current facility-administered medications for this visit  No Known Allergies    Review of Systems    Video Exam    There were no vitals filed for this visit  Physical Exam     (D) Kimber Arias attended his follow up psychotherapy session today  Kimber Arias reports that since his last session, he followed this writer's recommendation on purchasing the book, The CBT Workbook for Perfectionism  Kimber Arias reports that when he went through the checklist of characteristics of perfectionism, he reports having increased self-awareness in relation to obsessive thoughts, and tendencies  Kimber Arias reflected upon his struggle with potty training their son last Summer, reporting that he read a few books in relation to this  Kimber Arias reported feeling like his son was purposely going against what they were trying to implement, and Kimber Arias described himself as feeling frustrated in relation to this, when his son wasn't meeting his expectation  Kimber Arias describes having increased self-awareness in relation to this  Kimber Arias discussed stressors with raising his son, describing his son going through the stage of questioning things, wondering about how things work, and describes feeling frustrated in relation to this  Kimber Arias describes his wife as being (7) months pregnant, and reports looking forward to having another child   Kimber Arias reported ongoing interpersonal relationship stressors between he and his wife, reporting that they got in an argument the other day over the thermostat, reporting that he likes it within a certain range, and she wanted to go outside of the range due to it being hotter outside, and being uncomfortable with being (7) months pregnant  Rita Boykin reported that his wife woke up at night, hot and uncomfortable  Rita Ashutosh reported that his wife wanted to turn on the air conditioner, and he wanted to just use the fan  Rita Ashutosh reported that he turned on the fan, and then she got out of bed and turned on the air  Rita Ashutosh reported that he felt like his wife presented him with a choice, and then felt like he didn't have a choice  Rita Ashutosh reported that they were able to agree on a temperature and move forward  Rita Ashutosh reported that he felt he responded to the situation with ration and logic, and felt that she responded based upon emotion  Rita Boykin reports struggling with interpersonal relationship stressors with others who do things differently than he does  Rita Boykin discussed another incident between he and his wife when they decided to put together an [employer] shelf, reporting that he became short with her when irritable  Rita Boykin provided an example of when he was younger when he was learning to drive, he read his parents car manual front to back including the index  Rita Ashutosh reported that growing up his father would criticize his mother for not reading manuals  Rita Boykin and this writer processed this at length  Discussed ongoing skills  Reviewed limits and boundaries  Modeled effective forms of communication  Provided ongoing psychoeducation  (A) Rita Ashutosh denies any evident or immediate risk factors for self-harm, SI, or HI  Rita Dk presented as future oriented during session  Rita Dk denies any symptoms of nicole at this time  Rita Boykin presented with good eye contact  Rita Boykin presented as alert and oriented x3  Donato's speech presented at a normal rate, volume, and rhythm  Rita Boykin describes symptoms of trouble relaxing   Rita Boykin describes symptoms of becoming easily annoyed, reporting irritability, and reports poor frustration tolerance  Cheyennedutch Tillman describes feeling nervous, anxious, and on edge  Cheyennedutch Tillman reports feeling tired and having little energy  Donato's mood presented as anxious and his affect appeared to be congruent  Cheyennedutch Tillman describes some obsessive thoughts, and behaviors  Cheyennedutch Tillman describes some symptoms of anticipatory anxiety at times  Si César) Donato plans to work on implementing deep breathing techniques in the moment  Cheyenne Candle plans to actively work on being present in the moment, and utilize mindfulness and meditation techniques  Cheyenne Candle plans to continue to read the book, The CBT Workbook for Perfectionism and increase self-awareness in relation to obsessive tendencies  Cheyenne Candle plans to work on actively work on being present in the moment  Cheyenne Candle plans to continue to identify ways to implement limits and boundaries  Cheyenne Candle plans to work on identifying, associating, and expressing his feelings, utilizing effective forms of communication to target interpersonal relationship stressors  Donato plans to lean into natural supports  Cheyenne Candle plans to practice radical acceptance  Cheyenne Candle plans to utilize ongoing skills to target fluctuating symptoms  Cheyenne Candle plans to work on [employer] towards himself and others, and work on challenging negative thinking traps, and increasing self-awareness in relation to core beliefs  Cheyenne Candle plans to outreach for additional support as needed  I spent 45 minutes directly with the patient during this visit  VIRTUAL VISIT DISCLAIMER    Sandeep Hines acknowledges that he has consented to an online visit or consultation  He understands that the online visit is based solely on information provided by him, and that, in the absence of a face-to-face physical evaluation by the physician, the diagnosis he receives is both limited and provisional in terms of accuracy and completeness   This is not intended to replace a full medical face-to-face evaluation by the physician  Theodore Howard understands and accepts these terms

## 2021-05-17 DIAGNOSIS — F41.9 ANXIETY: ICD-10-CM

## 2021-05-17 RX ORDER — ESCITALOPRAM OXALATE 10 MG/1
15 TABLET ORAL DAILY
Qty: 135 TABLET | Refills: 1 | Status: SHIPPED | OUTPATIENT
Start: 2021-05-17 | End: 2021-07-19 | Stop reason: SDUPTHER

## 2021-06-02 ENCOUNTER — TELEPHONE (OUTPATIENT)
Dept: FAMILY MEDICINE CLINIC | Facility: CLINIC | Age: 36
End: 2021-06-02

## 2021-06-02 NOTE — TELEPHONE ENCOUNTER
Duane Carranza called and would like to get a T-Dap due to him and his wife expecting a baby next month   Is this ok to schedule for him?

## 2021-06-11 ENCOUNTER — CLINICAL SUPPORT (OUTPATIENT)
Dept: FAMILY MEDICINE CLINIC | Facility: CLINIC | Age: 36
End: 2021-06-11
Payer: COMMERCIAL

## 2021-06-11 DIAGNOSIS — Z23 NEED FOR VACCINATION: Primary | ICD-10-CM

## 2021-06-11 PROCEDURE — 90471 IMMUNIZATION ADMIN: CPT

## 2021-06-11 PROCEDURE — 90715 TDAP VACCINE 7 YRS/> IM: CPT

## 2021-06-14 ENCOUNTER — TELEMEDICINE (OUTPATIENT)
Dept: BEHAVIORAL/MENTAL HEALTH CLINIC | Facility: CLINIC | Age: 36
End: 2021-06-14
Payer: COMMERCIAL

## 2021-06-14 DIAGNOSIS — F60.5 OBSESSIVE-COMPULSIVE PERSONALITY DISORDER IN ADULT (HCC): Primary | ICD-10-CM

## 2021-06-14 PROCEDURE — 90832 PSYTX W PT 30 MINUTES: CPT | Performed by: SOCIAL WORKER

## 2021-06-14 NOTE — PSYCH
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 [facility]) - Primary        Goals addressed in session: Follow up psychotherapy session  Reason for visit is   Chief Complaint   Patient presents with    Virtual Regular Visit      Encounter provider Austyn Soriano    Provider located at [address] [address] [phone]    Recent Visits  No visits were found meeting these conditions  Showing recent visits within past 7 days and meeting all other requirements  Today's Visits  Date Type Provider Dept   06/14/21 Telemedicine Austyn Soriano [facility]   Showing today's visits and meeting all other requirements  Future Appointments  No visits were found meeting these conditions  Showing future appointments within next 150 days and meeting all other requirements     The patient was identified by name and date of birth  Yifan Trujillo was informed that this is a telemedicine visit and that the visit is being conducted through [email or web address] and patient was informed that this is not a secure, HIPAA-compliant platform  He agrees to proceed  My office door was closed  No one else was in the room  He acknowledged consent and understanding of privacy and security of the video platform  The patient has agreed to participate and understands they can discontinue the visit at any time  Patient is aware this is a billable service  Subjective  Yifan Trujillo is a 28 y o  male      HPI     Past Medical History:   Diagnosis Date    Varicella 1987     Past Surgical History:   Procedure Laterality Date    DENTAL SURGERY      Dental implant    WISDOM TOOTH EXTRACTION       Current Outpatient Medications   Medication Sig Dispense Refill    escitalopram (LEXAPRO) 10 mg tablet Take 1 5 tablets (15 mg total) by mouth daily 135 tablet 1    LORazepam (ATIVAN) 0 5 mg tablet Take 1 tablet (0 5 mg total) by mouth every 8 (eight) hours as needed for anxiety 30 tablet 0     No current facility-administered medications for this visit  No Known Allergies    Review of Systems    Video Exam    There were no vitals filed for this visit  Physical Exam     (D) Boo attended his follow up psychotherapy session today  Boo reports that since his last session, he has noticed a decrease in symptoms  Boo reported that his wife will be (44) weeks pregnant on this Friday  Boo reports that his wife is scheduled to be induced next 21  Boo discussed some anxiety in relation to this, and discussed stressors with his wife delivering a baby in the middle of a global pandemic, and the differences this time, compared to his first son's birth  Boo discussed preparing for having another  baby in the house, while caring for his other child, and having balance in relation to this  Marietta reported that their unborn baby is measuring as much larger than their first baby, and reports some anxiety about the possibility of needing to have a  rather than a natural delivery  Marietta discussed transitioning his son with having a  baby in the house, and having another siblings  Marietta reports that they have been reading him books, trying to prepare him  Marietta reported that he and his wife went away for their ivers[employer]leen [employer]och day weekend, reporting that the weather wasn't great, so they stayed in, ate food, watched movies, and relaxed  Boo reported that they went on a dolphin sight seeing experience  Boo reported that this was the first time in a while that they were able to get away without their son  Marietta discussed his wife's first pregnancy and trauma in relation to this, describing some triggers leading up to their second delivery   Boo and this writer processed this at length  Modeled effective forms of communication  Provided ongoing psychoeducation  Discussed ongoing skills  Reviewed limits and boundaries  (A) Donato's mood presented as somewhat anxious, and his affect appeared to be congruent  Suraj Ochoa reports a reduction in the intensity and frequency of his symptom since his last session  Suraj Ochoa describes symptoms of anticipatory anxiety in relation to different stressors  Surajelsie Ochoa describes some irritability, poor frustration tolerance, and irritability at times  Surajelsie Ochoa presented with good eye contact  Suraj Don presented as alert and oriented x3  Donato's speech presented at a normal rate, volume, and rhythm  Surajelsie Ochoa denies any symptoms of nicole  Suraj Don denies any evident or immediate risk factors for self-harm, SI, or HI  Surajelsie Ochoa describes some obsessive tendencies  Geovanna Ryan Donato plans to continue to work through symptoms of anticipatory anxiety with adjusting to new life changes, implementing skills in the moment  Suraj Ochoa plans to work on being present in the moment  Suraj Ochoa plans to increase the use of deep breathing techniques  Suraj Don plans to continue to identify ongoing ways to implement limits and boundaries  Suraj Pinacorinne plans to continue to increase the use of self-care, and take time for himself  Suraj Pinacorinne plans to continue to identify, associate, and express his feelings, utilizing healthy and effective forms of communication, advocating for himself, and assert himself  Donato plans to lean into natural supports  Suraj Don plans to practice radical acceptance, focus on what is in his control, and outweigh risks verses benefits in order to make informed decisions  Suraj Ochoa plans to identify worth within himself, challenge negative thinking traps, and core beliefs, decipher between facts and feelings with the use of reality testing  Suraj Ochoa plans to structure his schedule, increase balance, with consistency, and routine   Suraj Ochoa plans to outreach for additional support as needed  I spent 30 minutes directly with the patient during this visit  12:45pm-1:15pm   VIRTUAL VISIT DISCLAIMER    Anatoly Jesuselsa Simss acknowledges that he has consented to an online visit or consultation  He understands that the online visit is based solely on information provided by him, and that, in the absence of a face-to-face physical evaluation by the physician, the diagnosis he receives is both limited and provisional in terms of accuracy and completeness  This is not intended to replace a full medical face-to-face evaluation by the physician  Alfredo Harmon understands and accepts these terms

## 2021-07-12 ENCOUNTER — TELEPHONE (OUTPATIENT)
Dept: BEHAVIORAL/MENTAL HEALTH CLINIC | Facility: CLINIC | Age: 36
End: 2021-07-12

## 2021-07-12 NOTE — TELEPHONE ENCOUNTER
Sent Linnea invite for Donato's session today; however, he never logged in for his telehealth session- no show

## 2021-07-19 ENCOUNTER — OFFICE VISIT (OUTPATIENT)
Dept: FAMILY MEDICINE CLINIC | Facility: CLINIC | Age: 36
End: 2021-07-19
Payer: COMMERCIAL

## 2021-07-19 VITALS
DIASTOLIC BLOOD PRESSURE: 80 MMHG | HEIGHT: 70 IN | HEART RATE: 70 BPM | OXYGEN SATURATION: 98 % | RESPIRATION RATE: 16 BRPM | SYSTOLIC BLOOD PRESSURE: 110 MMHG | TEMPERATURE: 96.9 F | WEIGHT: 213 LBS | BODY MASS INDEX: 30.49 KG/M2

## 2021-07-19 DIAGNOSIS — F41.9 ANXIETY: ICD-10-CM

## 2021-07-19 PROCEDURE — 99213 OFFICE O/P EST LOW 20 MIN: CPT | Performed by: NURSE PRACTITIONER

## 2021-07-19 RX ORDER — ESCITALOPRAM OXALATE 10 MG/1
TABLET ORAL
Qty: 45 TABLET | Refills: 1 | OUTPATIENT
Start: 2021-07-19

## 2021-07-19 RX ORDER — ESCITALOPRAM OXALATE 10 MG/1
10 TABLET ORAL DAILY
Qty: 90 TABLET | Refills: 1 | Status: SHIPPED | OUTPATIENT
Start: 2021-07-19 | End: 2022-01-25

## 2021-07-19 NOTE — PATIENT INSTRUCTIONS
Decrease to 10 mg Lexapro then in 1 month decrease to 5 mg   Follow up with Tamra Martino as scheduled  Annual visit

## 2021-07-19 NOTE — PROGRESS NOTES
Assessment/Plan   Problem List Items Addressed This Visit     None      Visit Diagnoses     Anxiety        Relevant Medications    escitalopram (LEXAPRO) 10 mg tablet                Chief Complaint   Patient presents with    Follow-up     Ccheck up 6 months Anxiety       Subjective   Patient ID: Akankshahamzah Gaxiola is a 28 y o  male  Vitals:    07/19/21 0809   BP: 110/80   Pulse: 70   Resp: 16   Temp: (!) 96 9 °F (36 1 °C)   SpO2: 98%     Anxiety  Presents for follow-up (current dose lexapro 15 mg daily will decrease to 10 at patients request) visit  Symptoms include nervous/anxious behavior  Patient reports no chest pain, decreased concentration, hyperventilation, impotence, insomnia, irritability, palpitations, shortness of breath or suicidal ideas  Symptoms occur occasionally  The severity of symptoms is mild  The quality of sleep is good  Nighttime awakenings: none  Compliance with medications is %  Treatment side effects: none  The following portions of the patient's history were reviewed and updated as appropriate: allergies, current medications, past family history, past medical history, past surgical history and problem list     Review of Systems   Constitutional: Negative  Negative for irritability  HENT: Negative  Eyes: Negative  Respiratory: Negative  Negative for shortness of breath  Cardiovascular: Negative  Negative for chest pain and palpitations  Gastrointestinal: Negative  Endocrine: Negative  Genitourinary: Negative  Negative for impotence  Musculoskeletal: Negative  Skin: Negative  Allergic/Immunologic: Negative  Neurological: Negative  Hematological: Negative  Psychiatric/Behavioral: Negative for decreased concentration and suicidal ideas  The patient is nervous/anxious  The patient does not have insomnia  Objective     Physical Exam  Vitals and nursing note reviewed  Constitutional:       General: He is not in acute distress  Appearance: Normal appearance  He is not ill-appearing  HENT:      Head: Normocephalic and atraumatic  Nose: Nose normal  No congestion  Eyes:      General:         Right eye: No discharge  Left eye: No discharge  Extraocular Movements: Extraocular movements intact  Conjunctiva/sclera: Conjunctivae normal    Cardiovascular:      Rate and Rhythm: Normal rate  Pulses: Normal pulses  Heart sounds: Normal heart sounds  No murmur heard  Pulmonary:      Effort: Pulmonary effort is normal  No respiratory distress  Breath sounds: Normal breath sounds  Abdominal:      General: Abdomen is flat  Musculoskeletal:         General: Normal range of motion  Cervical back: Normal range of motion  Skin:     General: Skin is warm and dry  Capillary Refill: Capillary refill takes less than 2 seconds  Neurological:      Mental Status: He is alert and oriented to person, place, and time  Psychiatric:         Mood and Affect: Mood normal          Behavior: Behavior normal          Thought Content:  Thought content normal          Judgment: Judgment normal

## 2021-08-16 ENCOUNTER — TELEMEDICINE (OUTPATIENT)
Dept: BEHAVIORAL/MENTAL HEALTH CLINIC | Facility: CLINIC | Age: 36
End: 2021-08-16
Payer: COMMERCIAL

## 2021-08-16 DIAGNOSIS — F43.23 ADJUSTMENT DISORDER WITH MIXED ANXIETY AND DEPRESSED MOOD: Primary | ICD-10-CM

## 2021-08-16 DIAGNOSIS — F60.5 OBSESSIVE-COMPULSIVE PERSONALITY DISORDER IN ADULT (HCC): ICD-10-CM

## 2021-08-16 PROCEDURE — 90834 PSYTX W PT 45 MINUTES: CPT | Performed by: SOCIAL WORKER

## 2021-08-16 NOTE — PSYCH
Virtual Regular Visit    Verification of patient location: [place], [place]  Patient is located in the following state in which I hold an active license [place]    Assessment/Plan:    Problem List Items Addressed This Visit        Other    Adjustment disorder with mixed anxiety and depressed mood - Primary    Obsessive-compulsive personality disorder in [facility])        Goals addressed in session: Goal 1 Follow up psychotherapy session  Reason for visit is   Chief Complaint   Patient presents with    Virtual Regular Visit        Encounter provider Rishi Blankenship    Provider located at [address] [id number] [phone]      Recent Visits  No visits were found meeting these conditions  Showing recent visits within past 7 days and meeting all other requirements  Today's Visits  Date Type Provider Dept   08/16/21 Telemedicine Rishi Blankenship [facility]   Showing today's visits and meeting all other requirements  Future Appointments  No visits were found meeting these conditions  Showing future appointments within next 150 days and meeting all other requirements       The patient was identified by name and date of birth  Kirby Hsieh was informed that this is a telemedicine visit and that the visit is being conducted through [address] [place] [address] Now and patient was informed that this is a secure, HIPAA-compliant platform  He agrees to proceed    My office door was closed  No one else was in the room  He acknowledged consent and understanding of privacy and security of the video platform  The patient has agreed to participate and understands they can discontinue the visit at any time  Patient is aware this is a billable service  Subjective  Kirby Hsieh is a 28 y o  male        HPI     Past Medical History:   Diagnosis Date    Varicella 1987       Past Surgical History:   Procedure Laterality Date    DENTAL SURGERY      Dental implant    WISDOM TOOTH EXTRACTION         Current Outpatient Medications   Medication Sig Dispense Refill    escitalopram (LEXAPRO) 10 mg tablet Take 1 tablet (10 mg total) by mouth daily 90 tablet 1    LORazepam (ATIVAN) 0 5 mg tablet Take 1 tablet (0 5 mg total) by mouth every 8 (eight) hours as needed for anxiety 30 tablet 0     No current facility-administered medications for this visit  No Known Allergies    Review of Systems    Video Exam    There were no vitals filed for this visit  Physical Exam     (D) Demetrius Mcdonnell attended his follow up psychotherapy session today  Demetrius Mcdonnell reported that since his last session, his wife delivered their baby boy, who is now about (9 weeks old  Demetrius Mcdonnell reported that his baby is healthy, and everything went smoothly  Demetrius Mcdonnell discussed some sleep disturbances in relation to having a  baby, and feeling tired, and having little energy  Demetrius Mcdonnell reported struggles with decision making when it comes to , school, finances, and outweighing risks verses benefits in order to make decisions, specifically in relation to the idea of his wife quitting one of her [employer] jobs, verses keeping both of her jobs  Demetrius Mcdonnell discussed increased anxiety surrounding another wave of the coronavirus, in relation to the global pandemic, reporting increased nervousness, worrying, and anxiety  Demetrius Mcdonnell discussed stressors related to world related news  Demetrius Mcdonnell reported that 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, 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, and reports not looking forward to this  Bintacaren Mcdonnell discussed stressors related to his oldest son adjusting to having a new born baby, and having some behavior related issues that he and his wife are working on dealing with   Demetrius Mcdonnell discussed him and his wife are going back and forth about their son going to  (2) days a week, and trying to decide wether or not to have him wear a mask in school or not, verses even sending him to school at all  Maira Patrick reported that he is fully vaccinated, and his wife recently had her first vaccine, and is scheduled for her second vaccine soon  Maira Patrick and this writer processed this at length  Discussed ongoing skills  Reviewed limits and boundaries  Modeled effective forms of communication  Provided ongoing psychoeducation  (A) Maira Patrick presented with good eye contact  Mendozas speech presented at a normal rate, volume, and rhythm  Maira Patrick presented as alert and oriented x3  Maira Patrick denies any symptoms of nicole  Maira Patrick denies any evident or immediate risk factors for self-harm, SI, or HI  Donato's mood presented as anxious, and his affect appeared to be congruent  Maira Patrick describes symptoms of feeling nervous, anxious, an don edge, and worrying too much about different things  Maira Patrick reports feeling tired and having little energy  Maira Patrick reports some obsessive, intrusive, ruminating, and repetitive thoughts at times  Meron Ta) Donato plans to continue to work on making space for his feelings and emotions, and increase self-awareness in relation to identifying, associating, and expressing his feelings, using healthy and effective forms of communication  Shakir Landeros plans to align choices and decisions with what is in the best interest of him, along with outweighing risks verses benefits in order to make informed decisions  Maira Chimness plans to lean into his natural supports, increase the use of self-care, and take time for himself  Maira Patrick plans to continue to work on reestablishing limits and boundaries  Maira Patrick plans to advocate for himself, and assert himself  Maira Patrick plans to practice radical acceptance, and focus on what is in his control  Maira Patrick plans to utilize ongoing skills to address symptom presentation  Maira Patrick plans to continue to work on increasing deep breathing techniques, and practice mindfulness/ meditation techniques   Maira Patrick plans to increase validating statements towards himself, and explore core beliefs, and negative thinking traps, challenging them while increasing positive self-talk  Misha Saavedra plans to outreach for additional support as needed  I spent 38 minutes directly with the patient during this visit  2:45pm-3:23pm     VIRTUAL VISIT DISCLAIMER    Doris Chapman verbally agrees to participate in [employer]  Pt is aware that [employer] could be limited without vital signs or the ability to perform a full hands-on physical exam  Donato Chapman understands he or the provider may request at any time to terminate the video visit and request the patient to seek care or treatment in person

## 2021-09-13 ENCOUNTER — TELEMEDICINE (OUTPATIENT)
Dept: BEHAVIORAL/MENTAL HEALTH CLINIC | Facility: CLINIC | Age: 36
End: 2021-09-13
Payer: COMMERCIAL

## 2021-09-13 DIAGNOSIS — F43.23 ADJUSTMENT DISORDER WITH MIXED ANXIETY AND DEPRESSED MOOD: Primary | ICD-10-CM

## 2021-09-13 DIAGNOSIS — F60.5 OBSESSIVE-COMPULSIVE PERSONALITY DISORDER IN ADULT (HCC): ICD-10-CM

## 2021-09-13 PROCEDURE — 90834 PSYTX W PT 45 MINUTES: CPT | Performed by: SOCIAL WORKER

## 2021-09-13 NOTE — PSYCH
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 [facility])        Goals addressed in session: Goal 1 Follow up psychotherapy session  Reason for visit is   Chief Complaint   Patient presents with    Virtual Regular Visit      Encounter provider Dalia Mandel    Provider located at [address] [address] [phone]    Recent Visits  No visits were found meeting these conditions  Showing recent visits within past 7 days and meeting all other requirements  Today's Visits  Date Type Provider Dept   09/13/21 Telemedicine Dalia Mandel [facility]   Showing today's visits and meeting all other requirements  Future Appointments  No visits were found meeting these conditions  Showing future appointments within next 150 days and meeting all other requirements     The patient was identified by name and date of birth  Patrice Guerrier was informed that this is a telemedicine visit and that the visit is being conducted through [address] [place] [address] Now and patient was informed that this is a secure, HIPAA-compliant platform  He agrees to proceed  My office door was closed  No one else was in the room  He acknowledged consent and understanding of privacy and security of the video platform  The patient has agreed to participate and understands they can discontinue the visit at any time  Patient is aware this is a billable service  Subjective  Patrice Guerrier is a 28 y o  male      HPI     Past Medical History:   Diagnosis Date    Varicella 1987     Past Surgical History: Procedure Laterality Date    DENTAL SURGERY      Dental implant    WISDOM TOOTH EXTRACTION       Current Outpatient Medications   Medication Sig Dispense Refill    escitalopram (LEXAPRO) 10 mg tablet Take 1 tablet (10 mg total) by mouth daily 90 tablet 1    LORazepam (ATIVAN) 0 5 mg tablet Take 1 tablet (0 5 mg total) by mouth every 8 (eight) hours as needed for anxiety 30 tablet 0     No current facility-administered medications for this visit  No Known Allergies    Review of Systems    Video Exam    There were no vitals filed for this visit  Physical Exam     (D) Abdirizak Tanner attended her follow up psychotherapy session today  Abdirizak Tanner reports that since his last session, he has noticed a decrease in the intensity and frequency of his symptoms  Abdirizak Tanner reports that he continues to adjust to having a  baby at home, and acclimating his toddler to having a  sibling  Abdirizak Tanner discussed adhering to a structure routine, and desiring this  Abdirizak Tanner reports that he is preparing for his wife to go back to work next Wednesday, and 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, while taking care of their  child  Abdirizak Ciro discussed some worries in relation feeling like he won't be able to get work done with the responsibility of a child  Abdirizak Art discussed stressors with working hybrid, and now again having to commute to work after a year  Abdirizak Art discussed some stressors in relation to the global pandemic, the delta variant, restrictions, and another wave  Abdirizak Art discussed stressors related to world related news, specifically discussing what is going on in [place]  Abdirizak Tanner and this writer processed this at length  Discussed ongoing skills  Reviewed limits and boundaries  Modeled effective forms of communication  Provided ongoing psychoeducation  (A) Abdirizak Art presented as alert and oriented x3  Donato's speech presented at a normal rate, volume, and rhythm  Abdirizak Art presented with good eye contact   Abdirizak Art denies any evident or immediate risk factors for self-harm, SI, or HI  Denies any symptoms of nicole  Mood presented as euthymic, and his affect appeared to be congruent  Ely Si presented as rigid and linear in his thought processes  Reports a reduction in the intensity and frequency of symptoms of anxiety, and depression  Aringrid Si reports some worrying at times, and some anticipatory anxiety  Rimma Sewell plans to work ahead in relation to preparing his schedule, to structure his time, and have balance  Ely Si plans to actively engage in radical acceptance, focusing on what is in his control, verses what is not in his control  Donato plans to lean into natural supports  Armaryloula Si plans to prioritize his needs  Arzella Si plans to continue to reestablish boundaries for himself  Arzella Si plans to advocate for himself, assert himself, utilize healthy and effective forms of communication to strengthen interpersonal relationship stressors  Armaryloula Si plans to continue to work on increasing self-compassion, validating his feelings, increasing positive self-talk, while challenging negative core beliefs  Armaryloula Si plans to manage symptoms with continued skill use  Arzella Si plans to utilize deep breathing techniques, and practice mindfulness/ meditation techniques  Arzella Si plans to outreach for additional support as needed  I spent 40 minutes directly with the patient during this visit  2:45pm-3:25pm     VIRTUAL VISIT DISCLAIMER    Andrew Chapman verbally agrees to participate in [employer]  Pt is aware that [employer] could be limited without vital signs or the ability to perform a full hands-on physical exam  Donato Chapman understands he or the provider may request at any time to terminate the video visit and request the patient to seek care or treatment in person

## 2021-10-27 ENCOUNTER — TELEMEDICINE (OUTPATIENT)
Dept: BEHAVIORAL/MENTAL HEALTH CLINIC | Facility: CLINIC | Age: 36
End: 2021-10-27
Payer: COMMERCIAL

## 2021-10-27 DIAGNOSIS — F60.5 OBSESSIVE-COMPULSIVE PERSONALITY DISORDER IN ADULT (HCC): ICD-10-CM

## 2021-10-27 DIAGNOSIS — F43.23 ADJUSTMENT DISORDER WITH MIXED ANXIETY AND DEPRESSED MOOD: Primary | ICD-10-CM

## 2021-10-27 PROCEDURE — 90832 PSYTX W PT 30 MINUTES: CPT | Performed by: SOCIAL WORKER

## 2022-01-25 ENCOUNTER — OFFICE VISIT (OUTPATIENT)
Dept: FAMILY MEDICINE CLINIC | Facility: CLINIC | Age: 37
End: 2022-01-25
Payer: COMMERCIAL

## 2022-01-25 VITALS
DIASTOLIC BLOOD PRESSURE: 84 MMHG | OXYGEN SATURATION: 97 % | RESPIRATION RATE: 17 BRPM | HEIGHT: 70 IN | TEMPERATURE: 97.3 F | BODY MASS INDEX: 31.07 KG/M2 | SYSTOLIC BLOOD PRESSURE: 114 MMHG | HEART RATE: 70 BPM | WEIGHT: 217 LBS

## 2022-01-25 DIAGNOSIS — K21.9 GASTROESOPHAGEAL REFLUX DISEASE WITHOUT ESOPHAGITIS: ICD-10-CM

## 2022-01-25 DIAGNOSIS — Z13.6 SCREENING FOR CARDIOVASCULAR CONDITION: ICD-10-CM

## 2022-01-25 DIAGNOSIS — Z13.1 SCREENING FOR DIABETES MELLITUS: ICD-10-CM

## 2022-01-25 DIAGNOSIS — Z00.00 ANNUAL PHYSICAL EXAM: Primary | ICD-10-CM

## 2022-01-25 PROCEDURE — 99395 PREV VISIT EST AGE 18-39: CPT | Performed by: NURSE PRACTITIONER

## 2022-01-25 PROCEDURE — 3008F BODY MASS INDEX DOCD: CPT | Performed by: NURSE PRACTITIONER

## 2022-01-25 PROCEDURE — 3725F SCREEN DEPRESSION PERFORMED: CPT | Performed by: NURSE PRACTITIONER

## 2022-01-25 RX ORDER — FAMOTIDINE 20 MG/1
20 TABLET, FILM COATED ORAL 2 TIMES DAILY
Qty: 28 TABLET | Refills: 0 | Status: SHIPPED | OUTPATIENT
Start: 2022-01-25 | End: 2022-02-08

## 2022-01-25 NOTE — PROGRESS NOTES
[facility]    NAME: Trini President  AGE: 39 y o  SEX: male  : 1985     DATE: 2022     Assessment and Plan:     Problem List Items Addressed This Visit     None      Visit Diagnoses     Annual physical exam    -  Primary          Immunizations and preventive care screenings were discussed with patient today  Appropriate education was printed on patient's after visit summary  Counseling:  Alcohol/drug use: discussed moderation in alcohol intake, the recommendations for healthy alcohol use, and avoidance of illicit drug use  Dental Health: discussed importance of regular tooth brushing, flossing, and dental visits  Injury prevention: discussed safety/seat belts, safety helmets, smoke detectors, carbon dioxide detectors, and smoking near bedding or upholstery  Sexual health: discussed sexually transmitted diseases, partner selection, use of condoms, avoidance of unintended pregnancy, and contraceptive alternatives  · Exercise: the importance of regular exercise/physical activity was discussed  Recommend exercise 3-5 times per week for at least 30 minutes  BMI Counseling: Body mass index is 31 14 kg/m²  The BMI is above normal  Nutrition recommendations include decreasing portion sizes, encouraging healthy choices of fruits and vegetables, decreasing fast food intake, consuming healthier snacks, limiting drinks that contain sugar, moderation in carbohydrate intake, increasing intake of lean protein, reducing intake of saturated and trans fat and reducing intake of cholesterol  Exercise recommendations include moderate physical activity 150 minutes/week and strength training exercises  Rationale for BMI follow-up plan is due to patient being overweight or obese  Depression Screening and Follow-up Plan: Patient assessed for underlying major depression   Brief counseling provided and recommend additional follow-up/re-evaluation next office visit  No follow-ups on file  Chief Complaint:     Chief Complaint   Patient presents with    Annual Exam     No new issues      History of Present Illness:     Adult Annual Physical   Patient here for a comprehensive physical exam  The patient reports problems - dentist reports acid wear on teeth  Diet and Physical Activity  · Diet/Nutrition: well balanced diet and consuming 3-5 servings of fruits/vegetables daily  · Exercise: no formal exercise  Depression Screening  PHQ-2/9 Depression Screening    Little interest or pleasure in doing things: 0 - not at all  Feeling down, depressed, or hopeless: 0 - not at all  Trouble falling or staying asleep, or sleeping too much: 0 - not at all  Feeling tired or having little energy: 0 - not at all  Poor appetite or overeatin - not at all  Feeling bad about yourself - or that you are a failure or have let yourself or your family down: 0 - not at all  Trouble concentrating on things, such as reading the newspaper or watching television: 0 - not at all  Moving or speaking so slowly that other people could have noticed  Or the opposite - being so fidgety or restless that you have been moving around a lot more than usual: 0 - not at all  Thoughts that you would be better off dead, or of hurting yourself in some way: 0 - not at all  PHQ-9 Score: 0   PHQ-9 Interpretation: No or Minimal depression        General Health  · Sleep: sleeps well and gets 7-8 hours of sleep on average  · Hearing: normal - bilateral   · Vision: no vision problems, most recent eye exam <1 year ago and wears glasses  · Dental: regular dental visits, brushes teeth twice daily and flosses teeth occasionally   Health  · History of STDs?: no      Review of Systems:     Review of Systems   Constitutional: Negative  HENT: Negative  Eyes: Negative  Respiratory: Negative  Cardiovascular: Negative  Gastrointestinal: Negative  Endocrine: Negative  Genitourinary: Negative  Musculoskeletal: Negative  Skin: Negative  Allergic/Immunologic: Negative  Neurological: Negative  Hematological: Negative  Psychiatric/Behavioral: Negative         Past Medical History:     Past Medical History:   Diagnosis Date    Varicella 1987      Past Surgical History:     Past Surgical History:   Procedure Laterality Date    DENTAL SURGERY      Dental implant    WISDOM TOOTH EXTRACTION        Social History:     Social History     Socioeconomic History    Marital status: /Civil Union     Spouse name: None    Number of children: 1    Years of education: None    Highest education level: None   Occupational History    Occupation:    Tobacco Use    Smoking status: Never Smoker    Smokeless tobacco: Never Used   Vaping Use    Vaping Use: Never used   Substance and Sexual Activity    Alcohol use: Yes     Comment: rarely    Drug use: No    Sexual activity: Yes     Partners: Female     Birth control/protection: None   Other Topics Concern    None   Social History Narrative    Occasional alcohol use - As per [employer]     Social Determinants of Health     Financial Resource Strain: Not on file   Food Insecurity: Not on file   Transportation Needs: Not on file   Physical Activity: Insufficiently Active    Days of Exercise per Week: 3 days    Minutes of Exercise per Session: 30 min   Stress: No Stress Concern Present    Feeling of Stress : Not at all   Social Connections: Not on file   Intimate Partner Violence: Not At Risk    Fear of Current or Ex-Partner: No    Emotionally Abused: No    Physically Abused: No    Sexually Abused: No   Housing Stability: Not on file      Family History:     Family History   Problem Relation Age of Onset    Hypertension Mother     Hyperlipidemia Father     Hypertension Father     Skin cancer Father     Other Father     Alzheimer's disease Maternal Grandmother     Diabetes Paternal Grandmother         Diabetes mellitus with other type with complication    Stroke Paternal Grandmother     Heart disease Paternal Grandfather     Kidney disease Paternal Grandfather     Lung cancer Paternal Grandfather       Current Medications:     Current Outpatient Medications   Medication Sig Dispense Refill    LORazepam (ATIVAN) 0 5 mg tablet Take 1 tablet (0 5 mg total) by mouth every 8 (eight) hours as needed for anxiety 30 tablet 0     No current facility-administered medications for this visit  Allergies:     No Known Allergies   Physical Exam:     /84 (BP Location: Left arm, Patient Position: Sitting, Cuff Size: Standard)   Pulse 70   Temp (!) 97 3 °F (36 3 °C) (Tympanic)   Resp 17   Ht 5' 10" (1 778 m)   Wt 98 4 kg (217 lb)   SpO2 97%   BMI 31 14 kg/m²     Physical Exam  Vitals and nursing note reviewed  Constitutional:       Appearance: Normal appearance  He is not ill-appearing or toxic-appearing  HENT:      Head: Normocephalic and atraumatic  Right Ear: Tympanic membrane, ear canal and external ear normal  There is no impacted cerumen  Left Ear: Tympanic membrane, ear canal and external ear normal  There is no impacted cerumen  Nose: Nose normal  No congestion  Mouth/Throat:      Mouth: Mucous membranes are moist       Pharynx: Oropharynx is clear  No oropharyngeal exudate or posterior oropharyngeal erythema  Eyes:      General:         Right eye: No discharge  Left eye: No discharge  Extraocular Movements: Extraocular movements intact  Conjunctiva/sclera: Conjunctivae normal       Pupils: Pupils are equal, round, and reactive to light  Neck:      Vascular: No carotid bruit  Cardiovascular:      Rate and Rhythm: Normal rate and regular rhythm  Pulses: Normal pulses  Heart sounds: Normal heart sounds  No murmur heard  Pulmonary:      Effort: Pulmonary effort is normal  No respiratory distress        Breath sounds: Normal breath sounds  No wheezing or rhonchi  Abdominal:      General: Abdomen is flat  Bowel sounds are normal  There is no distension  Palpations: Abdomen is soft  There is no mass  Tenderness: There is no right CVA tenderness, left CVA tenderness or guarding  Musculoskeletal:         General: No swelling, tenderness, deformity or signs of injury  Normal range of motion  Cervical back: Normal range of motion and neck supple  No rigidity or tenderness  Right lower leg: No edema  Left lower leg: No edema  Lymphadenopathy:      Cervical: No cervical adenopathy  Skin:     General: Skin is warm and dry  Capillary Refill: Capillary refill takes less than 2 seconds  Neurological:      Mental Status: He is alert and oriented to person, place, and time  Psychiatric:         Mood and Affect: Mood normal          Behavior: Behavior normal          Thought Content:  Thought content normal          Judgment: Judgment normal           Aung Gray, [address] [place] [address]

## 2022-01-25 NOTE — PATIENT INSTRUCTIONS

## 2022-11-11 ENCOUNTER — OFFICE VISIT (OUTPATIENT)
Dept: FAMILY MEDICINE CLINIC | Facility: CLINIC | Age: 37
End: 2022-11-11

## 2022-11-11 VITALS
HEIGHT: 70 IN | WEIGHT: 204.6 LBS | DIASTOLIC BLOOD PRESSURE: 84 MMHG | SYSTOLIC BLOOD PRESSURE: 122 MMHG | TEMPERATURE: 98.1 F | OXYGEN SATURATION: 98 % | BODY MASS INDEX: 29.29 KG/M2 | RESPIRATION RATE: 18 BRPM | HEART RATE: 77 BPM

## 2022-11-11 DIAGNOSIS — J02.9 PHARYNGITIS, UNSPECIFIED ETIOLOGY: ICD-10-CM

## 2022-11-11 DIAGNOSIS — J32.9 SINUSITIS, UNSPECIFIED CHRONICITY, UNSPECIFIED LOCATION: Primary | ICD-10-CM

## 2022-11-11 PROBLEM — Z52.4 DONOR OF KIDNEY FOR TRANSPLANT: Status: ACTIVE | Noted: 2018-10-02

## 2022-11-11 RX ORDER — AMOXICILLIN AND CLAVULANATE POTASSIUM 875; 125 MG/1; MG/1
1 TABLET, FILM COATED ORAL EVERY 12 HOURS SCHEDULED
Qty: 14 TABLET | Refills: 0 | Status: SHIPPED | OUTPATIENT
Start: 2022-11-11 | End: 2022-11-18

## 2022-11-11 NOTE — PROGRESS NOTES
Assessment/Plan:     Diagnoses and all orders for this visit:    Sinusitis, unspecified chronicity, unspecified location  -     amoxicillin-clavulanate (AUGMENTIN) 875-125 mg per tablet; Take 1 tablet by mouth every 12 (twelve) hours for 7 days    Pharyngitis, unspecified etiology  -     amoxicillin-clavulanate (AUGMENTIN) 875-125 mg per tablet; Take 1 tablet by mouth every 12 (twelve) hours for 7 days      supportive care and symptomatic care as discussed   Meds as above   Follow-up as needed      Subjective:     Chief Complaint   Patient presents with   • Sick visit     Patient reports that he's had a sore throat, swollen lymph nodes, crusty eyes, congestion, and increased mucous for about a week  He's tested for COVID 3 times since symptoms started and has always tested negative  Patient ID: Cristy Murray is a 39 y o  male  Patient reports that he's had a sore throat, swollen lymph nodes, crusty eyes, congestion, and increased mucous for about a week  He's tested for COVID 3 times since symptoms started and has always tested negative  Started 2 weeks ago      The following portions of the patient's history were reviewed and updated as appropriate: allergies, current medications, past family history, past medical history, past social history, past surgical history and problem list     Review of Systems   Constitutional: Negative  HENT: Positive for congestion, sinus pressure and sore throat  Eyes: Negative  Respiratory: Negative  Cardiovascular: Negative  Gastrointestinal: Negative  Endocrine: Negative  Genitourinary: Negative  Musculoskeletal: Negative  Skin: Negative  Allergic/Immunologic: Negative  Neurological: Negative  Hematological: Negative  Psychiatric/Behavioral: Negative  All other systems reviewed and are negative          Objective:    Vitals:    11/11/22 0910   BP: 122/84   BP Location: Left arm   Patient Position: Sitting   Cuff Size: Large Pulse: 77   Resp: 18   Temp: 98 1 °F (36 7 °C)   TempSrc: Tympanic   SpO2: 98%   Weight: 92 8 kg (204 lb 9 6 oz)   Height: 5' 10" (1 778 m)          Physical Exam  Vitals and nursing note reviewed  Constitutional:       General: He is not in acute distress  Appearance: He is well-developed  HENT:      Head: Normocephalic  Right Ear: External ear normal       Left Ear: External ear normal       Nose: Congestion present  Mouth/Throat:      Pharynx: Posterior oropharyngeal erythema present  Eyes:      General:         Right eye: No discharge  Left eye: No discharge  Conjunctiva/sclera: Conjunctivae normal       Pupils: Pupils are equal, round, and reactive to light  Cardiovascular:      Rate and Rhythm: Normal rate and regular rhythm  Heart sounds: Normal heart sounds  Pulmonary:      Effort: Pulmonary effort is normal       Breath sounds: Normal breath sounds  Comments: Coarse sounding lungs b/l  Abdominal:      General: Bowel sounds are normal  There is no distension  Palpations: Abdomen is soft  Tenderness: There is no abdominal tenderness  Musculoskeletal:         General: Normal range of motion  Cervical back: Normal range of motion  Skin:     General: Skin is warm and dry  Findings: No rash  Neurological:      Mental Status: He is alert and oriented to person, place, and time  Cranial Nerves: No cranial nerve deficit  Psychiatric:         Behavior: Behavior normal          Thought Content:  Thought content normal          Judgment: Judgment normal

## 2023-01-21 LAB
ALBUMIN SERPL-MCNC: 4.9 G/DL (ref 4–5)
ALBUMIN/GLOB SERPL: 2 {RATIO} (ref 1.2–2.2)
ALP SERPL-CCNC: 78 IU/L (ref 44–121)
ALT SERPL-CCNC: 40 IU/L (ref 0–44)
APPEARANCE UR: CLEAR
AST SERPL-CCNC: 23 IU/L (ref 0–40)
BASOPHILS # BLD AUTO: 0.1 X10E3/UL (ref 0–0.2)
BASOPHILS NFR BLD AUTO: 1 %
BILIRUB SERPL-MCNC: 0.4 MG/DL (ref 0–1.2)
BILIRUB UR QL STRIP: NEGATIVE
BUN SERPL-MCNC: 14 MG/DL (ref 6–20)
BUN/CREAT SERPL: 14 (ref 9–20)
CALCIUM SERPL-MCNC: 10 MG/DL (ref 8.7–10.2)
CHLORIDE SERPL-SCNC: 100 MMOL/L (ref 96–106)
CHOLEST SERPL-MCNC: 171 MG/DL (ref 100–199)
CHOLEST/HDLC SERPL: 4.6 RATIO (ref 0–5)
CO2 SERPL-SCNC: 24 MMOL/L (ref 20–29)
COLOR UR: YELLOW
CREAT SERPL-MCNC: 0.99 MG/DL (ref 0.76–1.27)
EGFR: 101 ML/MIN/1.73
EOSINOPHIL # BLD AUTO: 0.2 X10E3/UL (ref 0–0.4)
EOSINOPHIL NFR BLD AUTO: 2 %
ERYTHROCYTE [DISTWIDTH] IN BLOOD BY AUTOMATED COUNT: 12.7 % (ref 11.6–15.4)
GLOBULIN SER-MCNC: 2.5 G/DL (ref 1.5–4.5)
GLUCOSE SERPL-MCNC: 92 MG/DL (ref 70–99)
GLUCOSE UR QL: NEGATIVE
HCT VFR BLD AUTO: 42 % (ref 37.5–51)
HDLC SERPL-MCNC: 37 MG/DL
HGB BLD-MCNC: 14.3 G/DL (ref 13–17.7)
HGB UR QL STRIP: NEGATIVE
IMM GRANULOCYTES # BLD: 0 X10E3/UL (ref 0–0.1)
IMM GRANULOCYTES NFR BLD: 0 %
KETONES UR QL STRIP: NEGATIVE
LDLC SERPL CALC-MCNC: 109 MG/DL (ref 0–99)
LEUKOCYTE ESTERASE UR QL STRIP: NEGATIVE
LYMPHOCYTES # BLD AUTO: 3.9 X10E3/UL (ref 0.7–3.1)
LYMPHOCYTES NFR BLD AUTO: 54 %
MCH RBC QN AUTO: 27.5 PG (ref 26.6–33)
MCHC RBC AUTO-ENTMCNC: 34 G/DL (ref 31.5–35.7)
MCV RBC AUTO: 81 FL (ref 79–97)
MICRO URNS: NORMAL
MONOCYTES # BLD AUTO: 0.6 X10E3/UL (ref 0.1–0.9)
MONOCYTES NFR BLD AUTO: 8 %
NEUTROPHILS # BLD AUTO: 2.5 X10E3/UL (ref 1.4–7)
NEUTROPHILS NFR BLD AUTO: 35 %
NITRITE UR QL STRIP: NEGATIVE
PH UR STRIP: 6.5 [PH] (ref 5–7.5)
PLATELET # BLD AUTO: 213 X10E3/UL (ref 150–450)
POTASSIUM SERPL-SCNC: 4.3 MMOL/L (ref 3.5–5.2)
PROT SERPL-MCNC: 7.4 G/DL (ref 6–8.5)
PROT UR QL STRIP: NEGATIVE
RBC # BLD AUTO: 5.2 X10E6/UL (ref 4.14–5.8)
SL AMB VLDL CHOLESTEROL CALC: 25 MG/DL (ref 5–40)
SODIUM SERPL-SCNC: 138 MMOL/L (ref 134–144)
SP GR UR: 1.02 (ref 1–1.03)
TRIGL SERPL-MCNC: 142 MG/DL (ref 0–149)
UROBILINOGEN UR STRIP-ACNC: 0.2 MG/DL (ref 0.2–1)
WBC # BLD AUTO: 7.2 X10E3/UL (ref 3.4–10.8)

## 2023-01-30 ENCOUNTER — OFFICE VISIT (OUTPATIENT)
Dept: FAMILY MEDICINE CLINIC | Facility: CLINIC | Age: 38
End: 2023-01-30

## 2023-01-30 VITALS
HEART RATE: 72 BPM | HEIGHT: 70 IN | BODY MASS INDEX: 29.18 KG/M2 | RESPIRATION RATE: 17 BRPM | WEIGHT: 203.8 LBS | DIASTOLIC BLOOD PRESSURE: 78 MMHG | OXYGEN SATURATION: 97 % | SYSTOLIC BLOOD PRESSURE: 120 MMHG | TEMPERATURE: 97.1 F

## 2023-01-30 DIAGNOSIS — Z00.00 ANNUAL PHYSICAL EXAM: Primary | ICD-10-CM

## 2023-01-30 NOTE — PROGRESS NOTES
ADULT ANNUAL PHYSICAL  [facility]    NAME: Marco Partida  AGE: 40 y o  SEX: male  : 1985     DATE: 2023     Assessment and Plan:     Problem List Items Addressed This Visit    None  Visit Diagnoses     Annual physical exam    -  Primary    BMI 29 0-29 9,adult              Immunizations and preventive care screenings were discussed with patient today  Appropriate education was printed on patient's after visit summary  Counseling:  Alcohol/drug use: discussed moderation in alcohol intake, the recommendations for healthy alcohol use, and avoidance of illicit drug use  Dental Health: discussed importance of regular tooth brushing, flossing, and dental visits  Injury prevention: discussed safety/seat belts, safety helmets, smoke detectors, carbon dioxide detectors, and smoking near bedding or upholstery  Sexual health: discussed sexually transmitted diseases, partner selection, use of condoms, avoidance of unintended pregnancy, and contraceptive alternatives  · Exercise: the importance of regular exercise/physical activity was discussed  Recommend exercise 3-5 times per week for at least 30 minutes  BMI Counseling: Body mass index is 29 24 kg/m²  The BMI is above normal  Nutrition recommendations include encouraging healthy choices of fruits and vegetables, decreasing fast food intake, increasing intake of lean protein and reducing intake of saturated and trans fat  Exercise recommendations include moderate physical activity 150 minutes/week  Rationale for BMI follow-up plan is due to patient being overweight or obese  Return in 1 year (on 2024)  Chief Complaint:     Chief Complaint   Patient presents with   • Annual Exam     CP      History of Present Illness:     Adult Annual Physical   Patient here for a comprehensive physical exam  The patient reports problems - congestion in right ear      Diet and Physical Activity  · Diet/Nutrition: well balanced diet and consuming 3-5 servings of fruits/vegetables daily  · Exercise: moderate cardiovascular exercise and 1-2 times a week on average  Depression Screening  PHQ-2/9 Depression Screening    Little interest or pleasure in doing things: 0 - not at all  Feeling down, depressed, or hopeless: 0 - not at all  Trouble falling or staying asleep, or sleeping too much: 0 - not at all  Feeling tired or having little energy: 0 - not at all  Poor appetite or overeatin - not at all  Feeling bad about yourself - or that you are a failure or have let yourself or your family down: 0 - not at all  Trouble concentrating on things, such as reading the newspaper or watching television: 0 - not at all  Moving or speaking so slowly that other people could have noticed  Or the opposite - being so fidgety or restless that you have been moving around a lot more than usual: 0 - not at all  Thoughts that you would be better off dead, or of hurting yourself in some way: 0 - not at all  PHQ-9 Score: 0   PHQ-9 Interpretation: No or Minimal depression        General Health  · Sleep: sleeps well and gets 7-8 hours of sleep on average  · Hearing: normal - bilateral   · Vision: goes for regular eye exams, most recent eye exam <1 year ago and wears glasses  · Dental: regular dental visits, brushes teeth twice daily and flosses teeth occasionally   Health  · History of STDs?: no      Review of Systems:     Review of Systems   Constitutional: Negative  HENT: Negative  Hearing loss: right ear congestion  Eyes: Negative  Respiratory: Negative  Cardiovascular: Negative  Gastrointestinal: Negative  Endocrine: Negative  Genitourinary: Negative  Musculoskeletal: Negative  Skin: Negative  Allergic/Immunologic: Negative  Neurological: Negative  Hematological: Negative  Psychiatric/Behavioral: Negative         Past Medical History:     Past Medical History: Diagnosis Date   • Varicella 1987      Past Surgical History:     Past Surgical History:   Procedure Laterality Date   • DENTAL SURGERY      Dental implant   • WISDOM TOOTH EXTRACTION        Social History:     Social History     Socioeconomic History   • Marital status: /Civil Union     Spouse name: None   • Number of children: 1   • Years of education: None   • Highest education level: None   Occupational History   • Occupation:    Tobacco Use   • Smoking status: Never   • Smokeless tobacco: Never   Vaping Use   • Vaping Use: Never used   Substance and Sexual Activity   • Alcohol use: Yes     Comment: rarely   • Drug use: No   • Sexual activity: Yes     Partners: Female     Birth control/protection: None   Other Topics Concern   • None   Social History Narrative    Occasional alcohol use - As per [facility]     Social Determinants of Health     Financial Resource Strain: Not on file   Food Insecurity: Not on file   Transportation Needs: Not on file   Physical Activity: Insufficiently Active   • Days of Exercise per Week: 1 day   • Minutes of Exercise per Session: 50 min   Stress: Not on file   Social Connections: Not on file   Intimate Partner Violence: Not At Risk   • Fear of Current or Ex-Partner: No   • Emotionally Abused: No   • Physically Abused: No   • Sexually Abused: No   Housing Stability: Not on file      Family History:     Family History   Problem Relation Age of Onset   • Hypertension Mother    • Hyperlipidemia Father    • Hypertension Father    • Skin cancer Father    • Other Father    • Alzheimer's disease Maternal Grandmother    • Diabetes Paternal Grandmother         Diabetes mellitus with other type with complication   • Stroke Paternal Grandmother    • Heart disease Paternal Grandfather    • Kidney disease Paternal Grandfather    • Lung cancer Paternal Grandfather       Current Medications:     No current outpatient medications on file       No current facility-administered medications for this visit  Allergies:     No Known Allergies   Physical Exam:     /78 (BP Location: Right arm, Patient Position: Sitting, Cuff Size: Large)   Pulse 72   Temp (!) 97 1 °F (36 2 °C) (Tympanic)   Resp 17   Ht 5' 10" (1 778 m)   Wt 92 4 kg (203 lb 12 8 oz)   SpO2 97%   BMI 29 24 kg/m²     Physical Exam  Vitals and nursing note reviewed  Constitutional:       Appearance: Normal appearance  He is not ill-appearing or toxic-appearing  HENT:      Head: Normocephalic and atraumatic  Right Ear: Tympanic membrane, ear canal and external ear normal  There is no impacted cerumen  Left Ear: Tympanic membrane, ear canal and external ear normal  There is no impacted cerumen  Nose: Nose normal  No congestion  Mouth/Throat:      Mouth: Mucous membranes are moist       Pharynx: Oropharynx is clear  No oropharyngeal exudate or posterior oropharyngeal erythema  Eyes:      General:         Right eye: No discharge  Left eye: No discharge  Extraocular Movements: Extraocular movements intact  Conjunctiva/sclera: Conjunctivae normal       Pupils: Pupils are equal, round, and reactive to light  Cardiovascular:      Rate and Rhythm: Normal rate and regular rhythm  Pulses: Normal pulses  Heart sounds: Normal heart sounds  No murmur heard  Pulmonary:      Effort: Pulmonary effort is normal  No respiratory distress  Breath sounds: Normal breath sounds  Abdominal:      General: Bowel sounds are normal       Palpations: Abdomen is soft  Tenderness: There is no right CVA tenderness or left CVA tenderness  Musculoskeletal:         General: No swelling  Normal range of motion  Cervical back: Normal range of motion  No rigidity or tenderness  Right lower leg: No edema  Left lower leg: No edema  Lymphadenopathy:      Cervical: No cervical adenopathy  Skin:     General: Skin is warm and dry        Capillary Refill: Capillary refill takes less than 2 seconds  Neurological:      Mental Status: He is alert and oriented to person, place, and time  Psychiatric:         Mood and Affect: Mood normal          Behavior: Behavior normal          Thought Content:  Thought content normal          Judgment: Judgment normal           Cliff Andre, [address] [place] [address]

## 2023-01-30 NOTE — PATIENT INSTRUCTIONS

## 2023-09-04 ENCOUNTER — APPOINTMENT (OUTPATIENT)
Dept: RADIOLOGY | Age: 38
End: 2023-09-04
Payer: COMMERCIAL

## 2023-09-04 ENCOUNTER — OFFICE VISIT (OUTPATIENT)
Dept: URGENT CARE | Age: 38
End: 2023-09-04
Payer: COMMERCIAL

## 2023-09-04 VITALS
HEART RATE: 72 BPM | HEIGHT: 70 IN | OXYGEN SATURATION: 97 % | WEIGHT: 205 LBS | TEMPERATURE: 97.8 F | RESPIRATION RATE: 18 BRPM | BODY MASS INDEX: 29.35 KG/M2

## 2023-09-04 DIAGNOSIS — S92.535A CLOSED NONDISPLACED FRACTURE OF DISTAL PHALANX OF LESSER TOE OF LEFT FOOT, INITIAL ENCOUNTER: Primary | ICD-10-CM

## 2023-09-04 DIAGNOSIS — T14.90XA INJURY: ICD-10-CM

## 2023-09-04 PROCEDURE — 73630 X-RAY EXAM OF FOOT: CPT

## 2023-09-04 PROCEDURE — 99213 OFFICE O/P EST LOW 20 MIN: CPT | Performed by: NURSE PRACTITIONER

## 2023-09-04 RX ORDER — IBUPROFEN 800 MG/1
TABLET ORAL EVERY 6 HOURS PRN
COMMUNITY

## 2023-09-04 NOTE — PROGRESS NOTES
[facility]        NAME: Tania Orellana is a 40 y.o. male  : 1985    MRN: [id number]  DATE: 2023  TIME: 4:02 PM    Assessment and Plan   Closed nondisplaced fracture of distal phalanx of lesser toe of left foot, initial encounter [S92.535A]  1. Closed nondisplaced fracture of distal phalanx of lesser toe of left foot, initial encounter  XR foot 3+ vw left    Ambulatory Referral to Podiatry            Patient Instructions     Possible fracture of the distal phalanx   orthopedic shoe was given to patient by the provider and placed on his left foot  OTC medication for pain  Referral to podiatry  Follow up with PCP in 3-5 days. Proceed to  ER if symptoms worsen. Chief Complaint     Chief Complaint   Patient presents with   • Toe Injury     Stubbed 4th toe on lef tfoot today. . having ongoing pain. .. buddy wrapped and wearing a boot. History of Present Illness       HPI   Presents to clinic with complaint of injury to the left foot. States he hit the foot onto his bed. Has been in severe pain. This was 3 hours ago. Pain located on the fourth toe of the left foot. No radiation of the pain. Worse with weightbearing    Review of Systems   Review of Systems   Musculoskeletal: Positive for arthralgias (Fourth toe, left foot) and joint swelling. Skin: Negative for rash and wound. Neurological: Negative for numbness.          Current Medications       Current Outpatient Medications:   •  ibuprofen (MOTRIN) 800 mg tablet, Take by mouth every 6 (six) hours as needed for mild pain, Disp: , Rfl:     Current Allergies     Allergies as of 2023   • (No Known Allergies)            The following portions of the patient's history were reviewed and updated as appropriate: allergies, current medications, past family history, past medical history, past social history, past surgical history and problem list.     Past Medical History:   Diagnosis Date   • Varicella        Past Surgical History:   Procedure Laterality Date   • DENTAL SURGERY      Dental implant   • WISDOM TOOTH EXTRACTION         Family History   Problem Relation Age of Onset   • Hypertension Mother    • Hyperlipidemia Father    • Hypertension Father    • Skin cancer Father    • Other Father    • Alzheimer's disease Maternal Grandmother    • Diabetes Paternal Grandmother         Diabetes mellitus with other type with complication   • Stroke Paternal Grandmother    • Heart disease Paternal Grandfather    • Kidney disease Paternal Grandfather    • Lung cancer Paternal Grandfather          Medications have been verified. Objective   Pulse 72   Temp 97.8 °F (36.6 °C)   Resp 18   Ht 5' 10" (1.778 m)   Wt 93 kg (205 lb)   SpO2 97%   BMI 29.41 kg/m²   No LMP for male patient. Physical Exam     Physical Exam  Musculoskeletal:         General: Swelling (Mild, distal aspect of the left fourth toe) and tenderness (With palpation of the affected area) present. Comments: Nail is intact   Skin:     Capillary Refill: Capillary refill takes less than 2 seconds. Neurological:      Gait: Gait abnormal (Limping on the left foot).

## 2023-09-05 ENCOUNTER — TELEPHONE (OUTPATIENT)
Dept: GASTROENTEROLOGY | Facility: CLINIC | Age: 38
End: 2023-09-05

## 2023-09-05 ENCOUNTER — OFFICE VISIT (OUTPATIENT)
Dept: GASTROENTEROLOGY | Facility: CLINIC | Age: 38
End: 2023-09-05
Payer: COMMERCIAL

## 2023-09-05 VITALS
HEIGHT: 70 IN | WEIGHT: 215 LBS | SYSTOLIC BLOOD PRESSURE: 128 MMHG | DIASTOLIC BLOOD PRESSURE: 88 MMHG | BODY MASS INDEX: 30.78 KG/M2

## 2023-09-05 DIAGNOSIS — Z12.11 COLON CANCER SCREENING: ICD-10-CM

## 2023-09-05 DIAGNOSIS — R12 HEARTBURN: ICD-10-CM

## 2023-09-05 DIAGNOSIS — R13.12 OROPHARYNGEAL DYSPHAGIA: Primary | ICD-10-CM

## 2023-09-05 PROCEDURE — 99204 OFFICE O/P NEW MOD 45 MIN: CPT | Performed by: INTERNAL MEDICINE

## 2023-09-05 NOTE — TELEPHONE ENCOUNTER
Scheduled date of EGD(as of today): 10/9/23  Physician performing EGD: Dr Robert Sadler  Location of EGD: [address] [place] [address]  Instructions reviewed with patient by: Michael Koehler  Clearances: No

## 2023-09-05 NOTE — PROGRESS NOTES
[facility] - Outpatient Consultation  Richy Stauffer 40 y.o. male MRN: [id number]  Encounter: [id number]    ASSESSMENT AND PLAN:      1. Oropharyngeal dysphagia  37M here today for 6 months of intermittent food regurgitation in small pieces with some sensation of food sitting in the back of throat. Unclear if there is a tonsilar stone vs a zenkers diverticulum vs just reflux induced pharyngitis. - FL barium swallow; Future  - EGD; Future  - Consider PPI therapy pending EGD  - May need referral to ENT    2. Heartburn  Intermittent issue. Uses OTC prn.    3. Colon cancer screening  avg risk. Can start at age 39. Followup Appointment: 2 months  ______________________________________________________________________    Chief Complaint   Patient presents with   • Dysphagia on and off     Pt had food come out of his nose when blowing it half hour or more after eating. Referred by VENKAT Cunningham         HPI:   Richy Stauffer is a 40y.o. year old male who presents today at the request of VENKAT Smallwood for oropharyngeal dysphagia. Pt states that for the past 6 months, he's noticed some intermittent issues of food feeling like it is in the back of the throat before he is completely done chewing. Often feels the food also regurgitates into his mouth, sometimes hours after he ate. He is asymptomatic inbetween. Episodes happening about 1x per week. No esoph dysphagia. Intermittent heartburn. Does takes NSAIDS and occ OTC tums prn. No n/v. No abd pain/gib. No weight changes.     Historical Information   Past Medical History:   Diagnosis Date   • Varicella 1987     Past Surgical History:   Procedure Laterality Date   • DENTAL SURGERY      Dental implant   • WISDOM TOOTH EXTRACTION       Social History     Substance and Sexual Activity   Alcohol Use Yes   • Alcohol/week: 1.0 standard drink of alcohol   • Types: 1 Cans of beer per week    Comment: rarely     Social History Substance and Sexual Activity   Drug Use No     Social History     Tobacco Use   Smoking Status Never   Smokeless Tobacco Never     Family History   Problem Relation Age of Onset   • Hypertension Mother    • Hyperlipidemia Father    • Hypertension Father    • Skin cancer Father    • Other Father    • Diabetes Father    • Alzheimer's disease Maternal Grandmother    • Arthritis Maternal Grandmother    • Diabetes Paternal Grandmother         Diabetes mellitus with other type with complication   • Stroke Paternal Grandmother    • Arthritis Paternal Grandmother    • Heart disease Paternal Grandfather    • Kidney disease Paternal Grandfather    • Lung cancer Paternal Grandfather    • Colon polyps Neg Hx    • Colon cancer Neg Hx        Meds/Allergies     Current Outpatient Medications:   •  ibuprofen (MOTRIN) 800 mg tablet    No Known Allergies    PHYSICAL EXAM:    Blood pressure 128/88, height 5' 10" (1.778 m), weight 97.5 kg (215 lb). Body mass index is 30.85 kg/m². General Appearance: NAD, cooperative, alert  Eyes: Anicteric, PERRLA, EOMI  ENT:  Normocephalic, atraumatic, normal mucosa. Neck:  Supple, symmetrical, trachea midline,   Resp:  Clear to auscultation bilaterally; no rales, rhonchi or wheezing; respirations unlabored   CV:  S1 S2, Regular rate and rhythm; no murmur, rub, or gallop. GI:  Soft, non-tender, non-distended; normal bowel sounds; no masses, no organomegaly   Rectal: Deferred  Musculoskeletal: No cyanosis, clubbing or edema. Normal ROM.   Skin:  No jaundice, rashes, or lesions   Heme/Lymph: No palpable cervical lymphadenopathy  Psych: Normal affect, good eye contact  Neuro: No gross deficits, AAOx3    Lab Results:   Lab Results   Component Value Date    WBC 7.2 01/20/2023    HGB 14.3 01/20/2023    HCT 42.0 01/20/2023    MCV 81 01/20/2023     01/20/2023     Lab Results   Component Value Date     02/24/2017    K 4.3 01/20/2023     01/20/2023    CO2 24 01/20/2023    BUN 14 01/20/2023    CREATININE 0.99 01/20/2023    GLUCOSE 102 (H) 02/24/2017    CALCIUM 9.8 02/14/2019    AST 23 01/20/2023    ALT 40 01/20/2023    ALKPHOS 63 02/14/2019    PROT 7.1 02/24/2017    BILITOT 0.3 02/24/2017    EGFR 101 01/20/2023     Lab Results   Component Value Date    FERRITIN 322 02/24/2017     No results found for: "LIPASE"    Radiology Results:   XR foot 3+ vw left    Result Date: 9/5/2023  Narrative: LEFT FOOT INDICATION:   T14.90XA: Injury, unspecified, initial encounter. Pain in fourth toe after forceful contact with bedpost. COMPARISON:  None VIEWS:  XR FOOT 3+ VW LEFT Images: 3 FINDINGS: There is no acute fracture or dislocation. No significant degenerative changes. No lytic or blastic osseous lesion. Soft tissues are unremarkable. Impression: No acute osseous abnormality. Resident: Omari Maradiaga, the attending radiologist, have reviewed the images and agree with the final report above. Workstation performed: [id number]         REVIEW OF SYSTEMS:    CONSTITUTIONAL: Denies any fever, chills, rigors, and weight loss. HEENT: No earache or tinnitus. Denies hearing loss or visual disturbances. CARDIOVASCULAR: No chest pain or palpitations. RESPIRATORY: Denies any cough, hemoptysis, shortness of breath or dyspnea on exertion. GASTROINTESTINAL: As noted in the History of Present Illness. GENITOURINARY: No problems with urination. Denies any hematuria or dysuria. NEUROLOGIC: No dizziness or vertigo, denies headaches. MUSCULOSKELETAL: Denies any muscle or joint pain. SKIN: Denies skin rashes or itching. ENDOCRINE: Denies excessive thirst. Denies intolerance to heat or cold. PSYCHOSOCIAL: Denies depression or anxiety. Denies any recent memory loss.    Answers for HPI/ROS submitted by the patient on 9/4/2023  Chronicity: chronic  Onset: more than 1 year ago  Onset quality: sudden  Frequency: intermittently  Episode duration: 6 Hours  Progression since onset: gradually improving  Pain location: suprapubic region  Pain - numeric: 3/10  Pain quality: cramping  Radiates to: does not radiate  anorexia: No  arthralgias: No  belching: No  constipation: No  diarrhea: Yes  dysuria: No  fever: No  flatus: No  frequency: Yes  headaches: No  hematochezia: No  hematuria: No  melena: No  myalgias: No  nausea:  No  weight loss: No  vomiting: No  Aggravated by: nothing  Relieved by: bowel movements, recumbency

## 2023-09-08 ENCOUNTER — TELEPHONE (OUTPATIENT)
Age: 38
End: 2023-09-08

## 2023-09-08 NOTE — TELEPHONE ENCOUNTER
Caller: Patient    Doctor/Office: na    Call regarding :  Received a referral     Call was transferred to: POD

## 2023-09-27 ENCOUNTER — HOSPITAL ENCOUNTER (OUTPATIENT)
Dept: RADIOLOGY | Facility: HOSPITAL | Age: 38
Discharge: HOME | End: 2023-09-27
Attending: INTERNAL MEDICINE
Payer: COMMERCIAL

## 2023-09-27 DIAGNOSIS — R13.12 OROPHARYNGEAL DYSPHAGIA: ICD-10-CM

## 2023-09-27 PROCEDURE — 74220 X-RAY XM ESOPHAGUS 1CNTRST: CPT

## 2023-09-29 ENCOUNTER — TELEPHONE (OUTPATIENT)
Dept: GASTROENTEROLOGY | Facility: CLINIC | Age: 38
End: 2023-09-29

## 2023-09-29 NOTE — TELEPHONE ENCOUNTER
Procedure confirmed  Endoscopy     Via: MyChart    Instructions given: Given to Patient at Visit     Prep Given: N/A    Call the office if there are any questions.

## 2023-10-08 ENCOUNTER — ANESTHESIA EVENT (OUTPATIENT)
Dept: ANESTHESIOLOGY | Facility: AMBULATORY SURGERY CENTER | Age: 38
End: 2023-10-08

## 2023-10-08 ENCOUNTER — ANESTHESIA (OUTPATIENT)
Dept: ANESTHESIOLOGY | Facility: AMBULATORY SURGERY CENTER | Age: 38
End: 2023-10-08

## 2023-10-08 RX ORDER — SODIUM CHLORIDE, SODIUM LACTATE, POTASSIUM CHLORIDE, CALCIUM CHLORIDE 600; 310; 30; 20 MG/100ML; MG/100ML; MG/100ML; MG/100ML
125 INJECTION, SOLUTION INTRAVENOUS CONTINUOUS
Status: CANCELLED | OUTPATIENT
Start: 2023-10-08

## 2023-10-09 ENCOUNTER — ANESTHESIA EVENT (OUTPATIENT)
Dept: GASTROENTEROLOGY | Facility: AMBULATORY SURGERY CENTER | Age: 38
End: 2023-10-09

## 2023-10-09 ENCOUNTER — HOSPITAL ENCOUNTER (OUTPATIENT)
Dept: GASTROENTEROLOGY | Facility: AMBULATORY SURGERY CENTER | Age: 38
Discharge: HOME | End: 2023-10-09
Attending: INTERNAL MEDICINE
Payer: COMMERCIAL

## 2023-10-09 ENCOUNTER — ANESTHESIA (OUTPATIENT)
Dept: GASTROENTEROLOGY | Facility: AMBULATORY SURGERY CENTER | Age: 38
End: 2023-10-09

## 2023-10-09 VITALS
HEART RATE: 56 BPM | WEIGHT: 215 LBS | TEMPERATURE: 98.6 F | BODY MASS INDEX: 30.78 KG/M2 | SYSTOLIC BLOOD PRESSURE: 130 MMHG | OXYGEN SATURATION: 98 % | RESPIRATION RATE: 16 BRPM | DIASTOLIC BLOOD PRESSURE: 83 MMHG | HEIGHT: 70 IN

## 2023-10-09 DIAGNOSIS — R13.12 OROPHARYNGEAL DYSPHAGIA: ICD-10-CM

## 2023-10-09 DIAGNOSIS — G47.33 OBSTRUCTIVE SLEEP APNEA SYNDROME: Primary | ICD-10-CM

## 2023-10-09 PROCEDURE — 43239 EGD BIOPSY SINGLE/MULTIPLE: CPT | Performed by: INTERNAL MEDICINE

## 2023-10-09 PROCEDURE — 88305 TISSUE EXAM BY PATHOLOGIST: CPT | Performed by: PATHOLOGY

## 2023-10-09 RX ORDER — PROPOFOL 10 MG/ML
INJECTION, EMULSION INTRAVENOUS AS NEEDED
Status: DISCONTINUED | OUTPATIENT
Start: 2023-10-09 | End: 2023-10-09

## 2023-10-09 RX ORDER — LIDOCAINE HYDROCHLORIDE 10 MG/ML
INJECTION, SOLUTION EPIDURAL; INFILTRATION; INTRACAUDAL; PERINEURAL AS NEEDED
Status: DISCONTINUED | OUTPATIENT
Start: 2023-10-09 | End: 2023-10-09

## 2023-10-09 RX ORDER — SODIUM CHLORIDE, SODIUM LACTATE, POTASSIUM CHLORIDE, CALCIUM CHLORIDE 600; 310; 30; 20 MG/100ML; MG/100ML; MG/100ML; MG/100ML
125 INJECTION, SOLUTION INTRAVENOUS CONTINUOUS
Status: DISCONTINUED | OUTPATIENT
Start: 2023-10-09 | End: 2023-10-09

## 2023-10-09 RX ORDER — SODIUM CHLORIDE, SODIUM LACTATE, POTASSIUM CHLORIDE, CALCIUM CHLORIDE 600; 310; 30; 20 MG/100ML; MG/100ML; MG/100ML; MG/100ML
50 INJECTION, SOLUTION INTRAVENOUS CONTINUOUS
Status: DISCONTINUED | OUTPATIENT
Start: 2023-10-09 | End: 2023-10-09

## 2023-10-09 RX ADMIN — LIDOCAINE HYDROCHLORIDE 30 MG: 10 INJECTION, SOLUTION EPIDURAL; INFILTRATION; INTRACAUDAL; PERINEURAL at 10:00

## 2023-10-09 RX ADMIN — PROPOFOL 100 MG: 10 INJECTION, EMULSION INTRAVENOUS at 10:06

## 2023-10-09 RX ADMIN — PROPOFOL 100 MG: 10 INJECTION, EMULSION INTRAVENOUS at 10:00

## 2023-10-09 RX ADMIN — SODIUM CHLORIDE, SODIUM LACTATE, POTASSIUM CHLORIDE, CALCIUM CHLORIDE 50 ML/HR: 600; 310; 30; 20 INJECTION, SOLUTION INTRAVENOUS at 09:15

## 2023-10-09 NOTE — ANESTHESIA PREPROCEDURE EVALUATION
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)       Pt states that for the past 6 months, he's noticed some intermittent issues of food feeling like it is in the back of the throat before he is completely done chewing. Often feels the food also regurgitates into his mouth, sometimes hours after he ate         Anesthesia Plan  ASA Score- 2     Anesthesia Type- IV sedation with anesthesia with ASA Monitors. Additional Monitors:   Airway Plan:           Plan Factors-    Chart reviewed. Patient summary reviewed. Patient is not a current smoker. Patient did not smoke on day of surgery. Induction- intravenous.     Postoperative Plan-     Informed Consent-

## 2023-10-09 NOTE — ANESTHESIA POSTPROCEDURE EVALUATION
Post-Op Assessment Note    CV Status:  Stable  Pain Score: 0    Pain management: adequate     Mental Status:  Awake   Hydration Status:  Stable   PONV Controlled:  None   Airway Patency:  Patent      Post Op Vitals Reviewed: Yes      Staff: Anesthesiologist, CRNA         No notable events documented.     BP   139/99   Temp      Pulse  72   Resp   16   SpO2   98

## 2023-10-09 NOTE — H&P
History and Physical - [address] [place] [address] [facility]    Gavin Huerta Adela 40 y.o. male MRN: [id number]      HPI: Harjeet Germain is a 40 y.o. male who presents for dysphagia. No Known Allergies      REVIEW OF SYSTEMS: Per the HPI, and otherwise unremarkable.     Historical Information     Past Medical History:   Diagnosis Date   • Varicella 1987     Past Surgical History:   Procedure Laterality Date   • DENTAL SURGERY      Dental implant   • NASAL SEPTUM SURGERY     • WISDOM TOOTH EXTRACTION       Social History   Social History     Substance and Sexual Activity   Alcohol Use Yes   • Alcohol/week: 1.0 standard drink of alcohol   • Types: 1 Cans of beer per week    Comment: rarely     Social History     Substance and Sexual Activity   Drug Use No     Social History     Tobacco Use   Smoking Status Never   Smokeless Tobacco Never     Family History   Problem Relation Age of Onset   • Hypertension Mother    • Hyperlipidemia Father    • Hypertension Father    • Skin cancer Father    • Other Father    • Diabetes Father    • Alzheimer's disease Maternal Grandmother    • Arthritis Maternal Grandmother    • Diabetes Paternal Grandmother         Diabetes mellitus with other type with complication   • Stroke Paternal Grandmother    • Arthritis Paternal Grandmother    • Heart disease Paternal Grandfather    • Kidney disease Paternal Grandfather    • Lung cancer Paternal Grandfather    • Colon polyps Neg Hx    • Colon cancer Neg Hx        Meds/Allergies       Current Outpatient Medications:   •  ibuprofen (MOTRIN) 800 mg tablet    Current Facility-Administered Medications:   •  lactated ringers infusion, 50 mL/hr, Intravenous, Continuous, 50 mL/hr at 10/09/23 0915  •  lactated ringers infusion, 125 mL/hr, Intravenous, Continuous        Objective     /76   Pulse 55   Temp 98.6 °F (37 °C) (Temporal)   Resp 17   Ht 5' 10" (1.778 m)   Wt 97.5 kg (215 lb)   SpO2 98%   BMI 30.85 kg/m²       PHYSICAL EXAM    Gen: NAD AAOx3  Head: Normocephalic, Atraumatic  CV: S1S2 RRR no m/r/g  CHEST: Clear b/l no c/r/w  ABD: soft, +BS NT/ND no masses  EXT: no edema      ASSESSMENT/PLAN:  This is a 40y.o. year old male here for EGD +/- DILATION, and he is stable and optimized for his procedure.

## 2023-10-09 NOTE — ANESTHESIA PREPROCEDURE EVALUATION
Procedure:  EGD    Relevant Problems   GI/HEPATIC   (+) Oropharyngeal dysphagia       Pt states that for the past 6 months, he's noticed some intermittent issues of food feeling like it is in the back of the throat before he is completely done chewing. Often feels the food also regurgitates into his mouth, sometimes hours after he ate    Anticipate challenging airway. May have KRYSTEN component. Small mouth opening, large tongue, thick neck. Not assessed for KRYSTEN formally. Physical Exam    Airway    Mallampati score: IV  TM Distance: <3 FB  Neck ROM: limited     Dental   No notable dental hx     Cardiovascular      Pulmonary      Other Findings        Anesthesia Plan  ASA Score- 2     Anesthesia Type- IV sedation with anesthesia with ASA Monitors. Additional Monitors:   Airway Plan:           Plan Factors-    Chart reviewed. Patient summary reviewed. Patient is not a current smoker. Patient did not smoke on day of surgery. Induction- intravenous. Postoperative Plan-     Informed Consent- Anesthetic plan and risks discussed with patient. I personally reviewed this patient with the CRNA. Discussed and agreed on the Anesthesia Plan with the CRNA. Hali Jensen

## 2023-10-12 PROCEDURE — 88305 TISSUE EXAM BY PATHOLOGIST: CPT | Performed by: PATHOLOGY

## 2024-01-31 ENCOUNTER — OFFICE VISIT (OUTPATIENT)
Dept: FAMILY MEDICINE CLINIC | Facility: CLINIC | Age: 39
End: 2024-01-31
Payer: COMMERCIAL

## 2024-01-31 VITALS
DIASTOLIC BLOOD PRESSURE: 84 MMHG | OXYGEN SATURATION: 97 % | WEIGHT: 210.6 LBS | RESPIRATION RATE: 16 BRPM | HEART RATE: 76 BPM | SYSTOLIC BLOOD PRESSURE: 120 MMHG | HEIGHT: 70 IN | BODY MASS INDEX: 30.15 KG/M2 | TEMPERATURE: 97.4 F

## 2024-01-31 DIAGNOSIS — F60.5 OBSESSIVE-COMPULSIVE PERSONALITY DISORDER IN ADULT (HCC): ICD-10-CM

## 2024-01-31 DIAGNOSIS — Z00.00 ENCOUNTER FOR WELL ADULT EXAM WITHOUT ABNORMAL FINDINGS: Primary | ICD-10-CM

## 2024-01-31 PROBLEM — Z52.4 DONOR OF KIDNEY FOR TRANSPLANT: Status: RESOLVED | Noted: 2018-10-02 | Resolved: 2024-01-31

## 2024-01-31 PROCEDURE — 3725F SCREEN DEPRESSION PERFORMED: CPT | Performed by: FAMILY MEDICINE

## 2024-01-31 PROCEDURE — 99395 PREV VISIT EST AGE 18-39: CPT | Performed by: FAMILY MEDICINE

## 2024-01-31 NOTE — PROGRESS NOTES
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 male.    Patient presents today for yearly physical  He is following with sleep medicine  He has an appointment tomorrow with them for what appears to be severe apnea otherwise he is doing well with no other acute complaints        The following portions of the patient's history were reviewed and updated as appropriate: allergies, current medications, past family history, past medical history, past social history, past surgical history and problem list.    Review of Systems   Constitutional: Negative.    HENT: Negative.     Eyes: Negative.    Respiratory: Negative.     Cardiovascular: Negative.    Gastrointestinal: Negative.    Endocrine: Negative.    Genitourinary: Negative.    Musculoskeletal: Negative.    Skin: Negative.    Allergic/Immunologic: Negative.    Neurological: Negative.    Hematological: Negative.    Psychiatric/Behavioral: Negative.     All other systems reviewed and are negative.        Objective:    Vitals:    01/31/24 0934   BP: 120/84   BP Location: Left arm   Patient Position: Sitting   Cuff Size: Large   Pulse: 76   Resp: 16   Temp: (!) 97.4 °F (36.3 °C)   TempSrc: Tympanic   SpO2: 97%   Weight: 95.5 kg (210 lb 9.6 oz)   Height: 5' 10\" (1.778 m)          Physical Exam  Vitals and nursing note reviewed.   Constitutional:       Appearance: Normal appearance.   HENT:      Head: Normocephalic.      Right Ear: Tympanic membrane, ear canal and external ear normal.     "  Left Ear: Tympanic membrane, ear canal and external ear normal.      Nose: Nose normal.      Mouth/Throat:      Mouth: Mucous membranes are moist.   Eyes:      Conjunctiva/sclera: Conjunctivae normal.      Pupils: Pupils are equal, round, and reactive to light.   Cardiovascular:      Rate and Rhythm: Normal rate and regular rhythm.   Pulmonary:      Effort: Pulmonary effort is normal.      Breath sounds: Normal breath sounds.   Abdominal:      General: Abdomen is flat. Bowel sounds are normal.      Palpations: Abdomen is soft.   Musculoskeletal:         General: Normal range of motion.   Skin:     General: Skin is warm and dry.   Neurological:      General: No focal deficit present.      Mental Status: He is alert and oriented to person, place, and time. Mental status is at baseline.   Psychiatric:         Mood and Affect: Mood normal.         Behavior: Behavior normal.         Thought Content: Thought content normal.         Judgment: Judgment normal.

## 2025-02-05 ENCOUNTER — OFFICE VISIT (OUTPATIENT)
Dept: FAMILY MEDICINE CLINIC | Facility: CLINIC | Age: 40
End: 2025-02-05
Payer: COMMERCIAL

## 2025-02-05 VITALS
HEIGHT: 70 IN | RESPIRATION RATE: 18 BRPM | WEIGHT: 211.8 LBS | SYSTOLIC BLOOD PRESSURE: 122 MMHG | TEMPERATURE: 97.6 F | DIASTOLIC BLOOD PRESSURE: 80 MMHG | BODY MASS INDEX: 30.32 KG/M2 | OXYGEN SATURATION: 98 % | HEART RATE: 91 BPM

## 2025-02-05 DIAGNOSIS — Z00.00 ENCOUNTER FOR WELL ADULT EXAM WITHOUT ABNORMAL FINDINGS: Primary | ICD-10-CM

## 2025-02-05 DIAGNOSIS — Z13.6 SCREENING FOR CARDIOVASCULAR CONDITION: ICD-10-CM

## 2025-02-05 DIAGNOSIS — F60.5 OBSESSIVE-COMPULSIVE PERSONALITY DISORDER IN ADULT (HCC): ICD-10-CM

## 2025-02-05 PROCEDURE — 99395 PREV VISIT EST AGE 18-39: CPT | Performed by: FAMILY MEDICINE
